# Patient Record
Sex: MALE | Race: WHITE | Employment: FULL TIME | URBAN - METROPOLITAN AREA
[De-identification: names, ages, dates, MRNs, and addresses within clinical notes are randomized per-mention and may not be internally consistent; named-entity substitution may affect disease eponyms.]

---
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
## 2021-01-15 ENCOUNTER — OFFICE VISIT (OUTPATIENT)
Dept: ONCOLOGY | Age: 21
End: 2021-01-15
Payer: COMMERCIAL

## 2021-01-15 ENCOUNTER — HOSPITAL ENCOUNTER (OUTPATIENT)
Dept: INFUSION THERAPY | Age: 21
Discharge: HOME | End: 2021-01-15
Payer: COMMERCIAL

## 2021-01-15 VITALS
WEIGHT: 249 LBS | HEART RATE: 75 BPM | TEMPERATURE: 96.7 F | SYSTOLIC BLOOD PRESSURE: 133 MMHG | HEIGHT: 71 IN | RESPIRATION RATE: 16 BRPM | DIASTOLIC BLOOD PRESSURE: 78 MMHG | OXYGEN SATURATION: 97 % | BODY MASS INDEX: 34.86 KG/M2

## 2021-01-15 DIAGNOSIS — Z86.718 H/O DEEP VENOUS THROMBOSIS: ICD-10-CM

## 2021-01-15 DIAGNOSIS — Z86.711 HISTORY OF PULMONARY EMBOLISM: ICD-10-CM

## 2021-01-15 DIAGNOSIS — Z86.711 HISTORY OF PULMONARY EMBOLISM: Primary | ICD-10-CM

## 2021-01-15 LAB
ALBUMIN SERPL-MCNC: 4.4 GM/DL (ref 3.4–5)
ALP BLD-CCNC: 73 IU/L (ref 40–128)
ALT SERPL-CCNC: 22 U/L (ref 10–40)
ANION GAP SERPL CALCULATED.3IONS-SCNC: 10 MMOL/L (ref 4–16)
AST SERPL-CCNC: 24 IU/L (ref 15–37)
BASOPHILS ABSOLUTE: 0 K/CU MM
BASOPHILS RELATIVE PERCENT: 0.5 % (ref 0–1)
BILIRUB SERPL-MCNC: 0.4 MG/DL (ref 0–1)
BUN BLDV-MCNC: 15 MG/DL (ref 6–23)
CALCIUM SERPL-MCNC: 9.1 MG/DL (ref 8.3–10.6)
CHLORIDE BLD-SCNC: 104 MMOL/L (ref 99–110)
CO2: 24 MMOL/L (ref 21–32)
CREAT SERPL-MCNC: 0.9 MG/DL (ref 0.9–1.3)
DIFFERENTIAL TYPE: ABNORMAL
EOSINOPHILS ABSOLUTE: 0.3 K/CU MM
EOSINOPHILS RELATIVE PERCENT: 4.2 % (ref 0–3)
GFR AFRICAN AMERICAN: >60 ML/MIN/1.73M2
GFR NON-AFRICAN AMERICAN: >60 ML/MIN/1.73M2
GLUCOSE BLD-MCNC: 102 MG/DL (ref 70–99)
HCT VFR BLD CALC: 45.5 % (ref 42–52)
HEMOGLOBIN: 15.8 GM/DL (ref 13.5–18)
HOMOCYSTEINE: 30 UMOL/L (ref 0–10)
IMMATURE NEUTROPHIL %: 0.3 % (ref 0–0.43)
LYMPHOCYTES ABSOLUTE: 2.2 K/CU MM
LYMPHOCYTES RELATIVE PERCENT: 35.6 % (ref 25–45)
MCH RBC QN AUTO: 32.7 PG (ref 27–31)
MCHC RBC AUTO-ENTMCNC: 34.7 % (ref 32–36)
MCV RBC AUTO: 94.2 FL (ref 78–100)
MONOCYTES ABSOLUTE: 0.6 K/CU MM
MONOCYTES RELATIVE PERCENT: 9.1 % (ref 0–4)
NUCLEATED RBC %: 0 %
PDW BLD-RTO: 11.4 % (ref 11.7–14.9)
PLATELET # BLD: 263 K/CU MM (ref 140–440)
PMV BLD AUTO: 9.6 FL (ref 7.5–11.1)
POTASSIUM SERPL-SCNC: 4.2 MMOL/L (ref 3.5–5.1)
RBC # BLD: 4.83 M/CU MM (ref 4.6–6.2)
SEGMENTED NEUTROPHILS ABSOLUTE COUNT: 3.1 K/CU MM
SEGMENTED NEUTROPHILS RELATIVE PERCENT: 50.3 % (ref 34–64)
SODIUM BLD-SCNC: 138 MMOL/L (ref 135–145)
TOTAL IMMATURE NEUTOROPHIL: 0.02 K/CU MM
TOTAL NUCLEATED RBC: 0 K/CU MM
TOTAL PROTEIN: 6.3 GM/DL (ref 6.4–8.2)
WBC # BLD: 6.2 K/CU MM (ref 4–10.5)

## 2021-01-15 PROCEDURE — 85025 COMPLETE CBC W/AUTO DIFF WBC: CPT

## 2021-01-15 PROCEDURE — 85302 CLOT INHIBIT PROT C ANTIGEN: CPT

## 2021-01-15 PROCEDURE — 83090 ASSAY OF HOMOCYSTEINE: CPT

## 2021-01-15 PROCEDURE — 80053 COMPREHEN METABOLIC PANEL: CPT

## 2021-01-15 PROCEDURE — 86146 BETA-2 GLYCOPROTEIN ANTIBODY: CPT

## 2021-01-15 PROCEDURE — G8427 DOCREV CUR MEDS BY ELIG CLIN: HCPCS | Performed by: INTERNAL MEDICINE

## 2021-01-15 PROCEDURE — 4004F PT TOBACCO SCREEN RCVD TLK: CPT | Performed by: INTERNAL MEDICINE

## 2021-01-15 PROCEDURE — 99211 OFF/OP EST MAY X REQ PHY/QHP: CPT

## 2021-01-15 PROCEDURE — G8484 FLU IMMUNIZE NO ADMIN: HCPCS | Performed by: INTERNAL MEDICINE

## 2021-01-15 PROCEDURE — 81241 F5 GENE: CPT

## 2021-01-15 PROCEDURE — 86147 CARDIOLIPIN ANTIBODY EA IG: CPT

## 2021-01-15 PROCEDURE — G8417 CALC BMI ABV UP PARAM F/U: HCPCS | Performed by: INTERNAL MEDICINE

## 2021-01-15 PROCEDURE — 81240 F2 GENE: CPT

## 2021-01-15 PROCEDURE — 85613 RUSSELL VIPER VENOM DILUTED: CPT

## 2021-01-15 PROCEDURE — 85303 CLOT INHIBIT PROT C ACTIVITY: CPT

## 2021-01-15 PROCEDURE — 85305 CLOT INHIBIT PROT S TOTAL: CPT

## 2021-01-15 PROCEDURE — 99214 OFFICE O/P EST MOD 30 MIN: CPT | Performed by: INTERNAL MEDICINE

## 2021-01-15 PROCEDURE — 36415 COLL VENOUS BLD VENIPUNCTURE: CPT

## 2021-01-15 PROCEDURE — 85300 ANTITHROMBIN III ACTIVITY: CPT

## 2021-01-15 ASSESSMENT — PATIENT HEALTH QUESTIONNAIRE - PHQ9
SUM OF ALL RESPONSES TO PHQ QUESTIONS 1-9: 0
SUM OF ALL RESPONSES TO PHQ QUESTIONS 1-9: 0
1. LITTLE INTEREST OR PLEASURE IN DOING THINGS: 0
SUM OF ALL RESPONSES TO PHQ9 QUESTIONS 1 & 2: 0

## 2021-01-15 NOTE — PROGRESS NOTES
MA Rooming Questions  Patient: Vincent Plaza  MRN: [id number]    Date: 1/15/2021        1. Do you have any new issues? yes - pain and tightness in right abdomen         2. Do you need any refills on medications?    no    3. Have you had any imaging done since your last visit? yes - xrays    4. Have you been hospitalized or seen in the emergency room since your last visit here?   yes [facility] for gallbladder removal    5. Did the patient have a depression screening completed today?  Yes    PHQ-9 Total Score: 0 (1/15/2021 11:42 AM)       PHQ-9 Given to (if applicable):               PHQ-9 Score (if applicable):                     [] Positive     [x]  Negative              Does question #9 need addressed (if applicable)                     [] Yes    []  No               Jakob Lira MA

## 2021-01-15 NOTE — PROGRESS NOTES
Patient Name: Panda Maya  Patient : 2000  Patient MRN: [id number]     Primary Oncologist: Darron Costa MD  Referring Provider: Jamey Hdez MD     Date of Service: 1/15/2021      Chief Complaint:   Chief Complaint   Patient presents with    Follow-up    Other     pain and tightness in right abdomen     Patient Active Problem List:     History of fracture of left ankle     History of pulmonary embolism     H/O deep venous thrombosis    HPI:  Scarlet Maliks. Gregorio Hamilton is a 23-year-old very pleasant gentleman, initially presented to us on 10/25/19 for evaluation of his left LE DVT and PE. He stated that he fractured his right tibia after a fall from [address] [place] backyard, when he was young. According to old chart he developed right LD acute DVT. He wasn't sure if he developed DVT after that (I received that informations from old chart and don't know how accurate was that informations. It seems like informations at that time was given by her parents. And he does't seem to remember). No other Thromboembolic phenomenon after that. Reported that on October 15 2019,  he heard a pop when he was taking a step forward, and was found to have left fifth metatarsal fracture. He was placed on a splint followed by a boot. On 2019, he noticed pain in the calf with mild swelling. He also reported right-sided chest pain, pleuritic in nature. Ultrasound lower extremity done on 10/22/19 showed occlusive DVT in the left popliteal, posterior tibial, and peroneal veins    CTA chest done on 10/22/19 showed small subsegmental embolism in the right lower lobe. Mild patchy subpleural opacities in the periphery of the right lung base compatible with hemorrhage versus infarction versus atelectasis versus pneumonia. He denies sedentary life style. One of his oldest sister has DVT after surgery. He said she is also morbidly obese. No other family history of VTE. He was treated with xarelto for about 5 months at that time. He underwent laparoscopic cholecystectomy for chronic calculous cholecystitis by Dr. Glory Jackson on 10/20/20. Four days later, he presented with right scrotal pain. 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 His pain went away soon after that. He was seen by Dr. Opal Clarke and he believe his right scrotal pain is possibly due to segmental infarct of right epididymus. In view of his prior history of DVT/PE and possible segmental infarct of right epididymus, he was referred to me for further evaluation of possible hypercoagulable state. He doesn't have any significant symptoms at today visit. Past Medical History  None significant    Surgical History  Significant for   1. Tonsillectomy and adenoidectomy  2. Cholecystectomy    Allergies  No known medication allergies    Social History  He is a current smoker and he smokes 2 to 3 cigarettes/day since 7/2020. He denies alcohol drinking or illicit drug abuse. Family History  Paternal grandfather with pancreatic cancer. One of his sister has DVT. Oncology History    No history exists. Review of Systems: \"Per interval history; otherwise 10 point ROS is negative. \"  \"Per interval history; otherwise 10 point ROS is negative. \"  His energy level is good, appetite, and sleep are fine. He doesn't have fever, chills, night sweats, cough, shortness of breath, chest pain, hemoptysis or palpitations. His bowel and bladder functions are normal. He denies nausea, vomiting, diarrhea, constipation, dysuria, loss of appetite or weight loss. He doesn't have neuropathy and he denies bleeding or clotting issues. He denies any pain on today visit. No anxiety or depression.  The rest of the systems are unremarkable.  Vital Signs:  /78 (Site: Right Upper Arm, Position: Sitting, Cuff Size: Medium Adult)   Pulse 75   Temp (!) 96.7 °F (35.9 °C) (Infrared)   Resp 16   Ht 5' 11\" (1.803 m)   Wt (!) 249 lb (112.9 kg)   SpO2 97%   BMI 34.73 kg/m²     Physical Exam:  CONSTITUTIONAL: awake, alert, cooperative, no apparent distress   EYES: pupils equal, round and reactive to light, sclera clear and conjunctiva normal  ENT: Normocephalic, without obvious abnormality, atraumatic  NECK: supple, symmetrical, no jugular venous distension and no carotid bruits   HEMATOLOGIC/LYMPHATIC: no cervical, supraclavicular or axillary lymphadenopathy   LUNGS: VBS, no wheezes, no crackles, no rhonchi, no increased work of breathing and clear to auscultation   CARDIOVASCULAR: regular rate and rhythm, normal S1 and S2, no murmur noted  ABDOMEN: normal bowel sounds x 4, soft, non-distended, non-tender, no masses palpated, no hepatosplenomegaly   MUSCULOSKELETAL: full range of motion noted, tone is normal  NEUROLOGIC: awake, alert, oriented to name, place and time. Motor skills grossly intact. SKIN: Normal skin color, texture, turgor and no jaundice.  appears intact   EXTREMITIES: no LE edema, no leg swelling, no cyanosis, no clubbing     Labs:  Hematology:  Lab Results   Component Value Date    WBC 6.2 01/15/2021    RBC 4.83 01/15/2021    HGB 15.8 01/15/2021    HCT 45.5 01/15/2021    MCV 94.2 01/15/2021    MCH 32.7 (H) 01/15/2021    MCHC 34.7 01/15/2021    RDW 11.4 (L) 01/15/2021     01/15/2021    MPV 9.6 01/15/2021    SEGSPCT 50.3 01/15/2021    EOSRELPCT 4.2 (H) 01/15/2021    BASOPCT 0.5 01/15/2021    LYMPHOPCT 35.6 01/15/2021    MONOPCT 9.1 (H) 01/15/2021    SEGSABS 3.1 01/15/2021    EOSABS 0.3 01/15/2021    BASOSABS 0.0 01/15/2021    LYMPHSABS 2.2 01/15/2021    MONOSABS 0.6 01/15/2021    DIFFTYPE AUTOMATED DIFFERENTIAL 01/15/2021     No results found for: ESR  Chemistry:  Lab Results   Component Value Date     10/24/2020 K 3.7 10/24/2020     10/24/2020    CO2 27 10/24/2020    BUN 10 10/24/2020    CREATININE 0.9 10/24/2020    GLUCOSE 93 10/24/2020    CALCIUM 8.9 10/24/2020    PROT 6.6 10/24/2020    LABALBU 4.3 10/24/2020    BILITOT 0.3 10/24/2020    ALKPHOS 84 10/24/2020    AST 55 (H) 10/24/2020     (H) 10/24/2020    LABGLOM >60 10/24/2020    GFRAA >60 10/24/2020     No results found for: MMA, LDH, HOMOCYSTEINE  No components found for: LD  No results found for: TSHHS, T4FREE, FT3  Immunology:  Lab Results   Component Value Date    PROT 6.6 10/24/2020     No results found for: Meño Rook, KLFLCR  No results found for: B2M  Coagulation Panel:  No results found for: PROTIME, INR, APTT, DDIMER  Anemia Panel:  No results found for: KMLUBBUC17, FOLATE  Tumor Markers:  No results found for: , CEA, , LABCA2, PSA  Observations:  PHQ-9 Total Score: 0 (1/15/2021 11:42 AM)     Assessment & Plan:   Adam PHAM Caron Christopher is a 43-year-old gentleman with history of LLE DVT and PE in 10/2019. It seems like provoked secondary to the stress fracture causing impaired mobility at that time. He was treated with 5 months of xarelto at that time. On 10/24/20, he presented with right scrotal pain, 4 days after laparoscopic cholecystectomy. Was seen by Dr. Vince Alcantara and he believes that he probably had segmental infarct in right epididymus. Since he has both arterial and venous thrombosis at age younger than 39, I recommend to send for proper hypercoagulable work ups. Based on findings on hypercoagulable work ups, we will decide if he needs to be on either anticoagulation or antiplatelet therapy. I answered all his questions and concerns for today. I asked him to follow up with primary care physician, Dr. Krish Marsh and urology, Dr. Vince Alcantara on regular basis. Thank you for allowing me to participate in the care of this very pleasant gentleman. Recent imaging and labs were reviewed and discussed with the patient.

## 2021-01-17 LAB
ANTICARDIOLIPIN IGA ANTIBODY: 0 APL (ref 0–11)
ANTICARDIOLIPIN IGG ANTIBODY: 0 GPL (ref 0–14)
ANTITHROMBIN ACTIVITY: 94 % (ref 76–128)
BETA 2 GLYCOPROT.1 IGA AB: 2 SAU (ref 0–20)
BETA 2 GLYCOPROT.1 IGM AB: 1 SMU (ref 0–20)
BETA-2 GLYCOPROTEIN 1 IGG ANTIBODY: 0 SGU (ref 0–20)
CARDIOLIPIN AB IGM: 0 MPL (ref 0–12)
PROTEIN C ACTIVITY: 124 % (ref 83–168)
PROTEIN C ANTIGEN: 76 % (ref 63–153)
PROTEIN S ACTIVITY: 89 % (ref 66–143)
PROTEIN S ANTIGEN, TOTAL: 91 % (ref 84–134)

## 2021-01-18 LAB
DILUTE RUSSELL VIPER VENOM TIME: 32 SEC (ref 33–44)
DRVVT 1 TO 1 MIX REFLEX ONLY: ABNORMAL SEC (ref 33–44)
DRVVT CONFIRMATION TEST: ABNORMAL RATIO

## 2021-01-20 LAB — PROTHROMBIN G20210A MUTATION: NEGATIVE

## 2021-01-23 LAB — FACTOR V LEIDEN: NEGATIVE

## 2021-01-25 LAB
CALR MUTATION: NORMAL
JAK2 EXONS 12-15 MUTATION: NORMAL
JAK2 GENE MUTATION QUAL: NORMAL
MPL 515 MUTATION: NORMAL

## 2021-02-04 NOTE — PROGRESS NOTES
He underwent laparoscopic cholecystectomy for chronic calculous cholecystitis by Dr. Crystal Turner on 10/20/20. Four days later, he presented with right scrotal pain. 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 His pain went away soon after that. He was seen by Dr. Pastor Cronin and he believe his right scrotal pain is possibly due to segmental infarct of right epididymus. In view of his prior history of DVT/PE and possible segmental infarct of right epididymus, he was referred to me for further evaluation of possible hypercoagulable state. Since he has both arterial and venous thrombosis at age younger than 40, I recommend to send for proper hypercoagulable work ups. Hypercoagulable work ups done on 1/15/21 showed mild elevation in homocysteine level (30 umol/L). The rests of the hypercoagulable work ups (including factor V leiden, prothrombin gene mutation, protein C, protein S, anti thrombin III, anti phospholipid panels, JAK2, MPL and CALR were within normal range. On February 5, 2021, he presented to me for follow-up. On today visit, I reviewed with him findings hypercoagulable work-ups. Since his homocystine level is only mildly elevated, I do not think it is a cause of his venous or arterial thrombosis. However, I recommend him to take vitamin K21 and folic acid. All other hypercoagulable work ups were within normal range and I do not recommend anticoagulabiton therapy. I recommend him to take aspirin 81 mg daily to prevent arterial thrombosis. I will continue to follow him periodically. If he develops another episode of thrombosis, I will recommend to start anticoagulation therapy at that time. He doesn't have any significant symptoms at today visit. Past Medical History  None significant    Surgical History  Significant for   1. Tonsillectomy and adenoidectomy  2.   Cholecystectomy    Allergies  No known medication allergies    Social History He is a current smoker and he smokes 2 to 3 cigarettes/day since 7/2020. He denies alcohol drinking or illicit drug abuse. Family History  Paternal grandfather with pancreatic cancer. One of his sister has DVT. Oncology History    No history exists. Review of Systems: \"Per interval history; otherwise 10 point ROS is negative. \"  His energy level is fine, appetite, and sleep are pretty good. He denies fever, chills, night sweats, cough, shortness of breath, chest pain, hemoptysis or palpitations. His bowel and bladder functions are normal. He doesn't have nausea, vomiting, diarrhea, constipation, dysuria, loss of appetite or weight loss. He denies neuropathy and he doesn't have bleeding issues. He doesn't have any pain on today visit. Denies anxiety or depression. The rest of the systems are unremarkable.      Vital Signs:  /72 (Site: Right Upper Arm, Position: Sitting, Cuff Size: Large Adult)   Pulse 61   Temp 96.8 °F (36 °C) (Infrared)   Resp 18   Ht 5' 11\" (1.803 m)   Wt (!) 247 lb 3.2 oz (112.1 kg)   SpO2 98%   BMI 34.48 kg/m²     Physical Exam:  CONSTITUTIONAL: awake, alert, cooperative, no apparent distress   EYES: pupils equal, round and reactive to light, sclera clear and conjunctiva normal  ENT: Normocephalic, without obvious abnormality, atraumatic  NECK: supple, symmetrical, no jugular venous distension and no carotid bruits   HEMATOLOGIC/LYMPHATIC: no cervical, supraclavicular or axillary lymphadenopathy   LUNGS: no wheezes, no crackles, VBS, no rhonchi, no increased work of breathing and clear to auscultation   CARDIOVASCULAR: regular rate and rhythm, normal S1 and S2, no murmur noted  ABDOMEN: normal bowel sounds x 4, soft, non-distended, non-tender, no masses palpated, no hepatosplenomegaly   MUSCULOSKELETAL: full range of motion noted, tone is normal  NEUROLOGIC: awake, alert, oriented to name, place and time. Motor skills grossly intact. Adam Cohen is a 71-year-old gentleman with history of LLE DVT and PE in 10/2019. It seems like provoked secondary to the stress fracture causing impaired mobility at that time. He was treated with 5 months of xarelto at that time. On 10/24/20, he presented with right scrotal pain, 4 days after laparoscopic cholecystectomy. Was seen by Dr. Jacob Lagos and he believes that he probably had segmental infarct in right epididymus. Since he has both arterial and venous thrombosis at age younger than 40, I recommend to send for proper hypercoagulable work ups. Hypercoagulable work ups done on 1/15/21 showed mild elevation in homocysteine level (30 umol/L). The rests of the hypercoagulable work ups (including factor V leiden, prothrombin gene mutation, protein C, protein S, anti thrombin III, anti phospholipid panels, JAK2, MPL and CALR were within normal range. On February 5, 2021, he presented to me for follow-up. On today visit, I reviewed with him findings hypercoagulable work-ups. Since his homocystine level is only mildly elevated, I do not think it is a cause of his venous or arterial thrombosis. However, I recommend him to take vitamin S39 and folic acid. All other hypercoagulable work ups were within normal range and I do not recommend anticoagulabiton therapy. I recommend him to take aspirin 81 mg daily to prevent arterial thrombosis. I will continue to follow him periodically. If he develops another episode of thrombosis, I will recommend to start anticoagulation therapy at that time. I answered all his questions and concerns for today. I asked him to follow up with primary care physician, Dr. Lila Milian and urology, Dr. Jacob Lagos on regular basis. Thank you for allowing me to participate in the care of this very pleasant gentleman. Recent imaging and labs were reviewed and discussed with the patient.

## 2021-02-05 ENCOUNTER — OFFICE VISIT (OUTPATIENT)
Dept: ONCOLOGY | Age: 21
End: 2021-02-05
Payer: COMMERCIAL

## 2021-02-05 ENCOUNTER — HOSPITAL ENCOUNTER (OUTPATIENT)
Dept: INFUSION THERAPY | Age: 21
Discharge: HOME | End: 2021-02-05
Payer: COMMERCIAL

## 2021-02-05 VITALS
RESPIRATION RATE: 18 BRPM | WEIGHT: 247.2 LBS | TEMPERATURE: 96.8 F | HEART RATE: 61 BPM | SYSTOLIC BLOOD PRESSURE: 136 MMHG | DIASTOLIC BLOOD PRESSURE: 72 MMHG | OXYGEN SATURATION: 98 % | BODY MASS INDEX: 34.61 KG/M2 | HEIGHT: 71 IN

## 2021-02-05 DIAGNOSIS — Z86.718 H/O DEEP VENOUS THROMBOSIS: ICD-10-CM

## 2021-02-05 DIAGNOSIS — Z86.711 HISTORY OF PULMONARY EMBOLISM: Primary | ICD-10-CM

## 2021-02-05 PROCEDURE — G8417 CALC BMI ABV UP PARAM F/U: HCPCS | Performed by: INTERNAL MEDICINE

## 2021-02-05 PROCEDURE — 4004F PT TOBACCO SCREEN RCVD TLK: CPT | Performed by: INTERNAL MEDICINE

## 2021-02-05 PROCEDURE — 99213 OFFICE O/P EST LOW 20 MIN: CPT | Performed by: INTERNAL MEDICINE

## 2021-02-05 PROCEDURE — 99211 OFF/OP EST MAY X REQ PHY/QHP: CPT

## 2021-02-05 PROCEDURE — G8427 DOCREV CUR MEDS BY ELIG CLIN: HCPCS | Performed by: INTERNAL MEDICINE

## 2021-02-05 PROCEDURE — G8484 FLU IMMUNIZE NO ADMIN: HCPCS | Performed by: INTERNAL MEDICINE

## 2021-02-05 RX ORDER — FOLIC ACID 1 MG/1
1 TABLET ORAL DAILY
Qty: 30 TABLET | Refills: 5 | Status: SHIPPED | OUTPATIENT
Start: 2021-02-05 | End: 2021-07-14

## 2021-02-05 RX ORDER — UBIDECARENONE 75 MG
100 CAPSULE ORAL DAILY
Qty: 30 TABLET | Refills: 3 | Status: SHIPPED | OUTPATIENT
Start: 2021-02-05 | End: 2022-01-12

## 2021-02-05 RX ORDER — OMEPRAZOLE 40 MG/1
CAPSULE, DELAYED RELEASE ORAL
COMMUNITY
Start: 2020-12-26 | End: 2022-01-03

## 2021-02-05 RX ORDER — ASPIRIN 81 MG/1
162 TABLET ORAL DAILY
Qty: 90 TABLET | Refills: 1 | Status: SHIPPED | OUTPATIENT
Start: 2021-02-05 | End: 2021-03-22

## 2021-02-05 NOTE — PROGRESS NOTES
MA Rooming Questions  Patient: Emily Olivia  MRN: [id number]    Date: 2/5/2021        1. Do you have any new issues?   no         2. Do you need any refills on medications?    no    3. Have you had any imaging done since your last visit?   no    4. Have you been hospitalized or seen in the emergency room since your last visit here?   no    5. Did the patient have a depression screening completed today?  No    No data recorded     PHQ-9 Given to (if applicable):               PHQ-9 Score (if applicable):                     [] Positive     []  Negative              Does question #9 need addressed (if applicable)                     [] Yes    []  No               Driss Chawla MA

## 2021-03-22 RX ORDER — ASPIRIN 81 MG/1
TABLET ORAL
Qty: 90 TABLET | Refills: 1 | Status: SHIPPED | OUTPATIENT
Start: 2021-03-22 | End: 2022-01-12

## 2021-07-07 ENCOUNTER — HOSPITAL ENCOUNTER (EMERGENCY)
Age: 21
Discharge: HOME | End: 2021-07-07
Attending: EMERGENCY MEDICINE
Payer: COMMERCIAL

## 2021-07-07 ENCOUNTER — APPOINTMENT (OUTPATIENT)
Dept: GENERAL RADIOLOGY | Age: 21
End: 2021-07-07
Payer: COMMERCIAL

## 2021-07-07 VITALS
OXYGEN SATURATION: 97 % | BODY MASS INDEX: 35 KG/M2 | TEMPERATURE: 98.1 F | HEART RATE: 58 BPM | SYSTOLIC BLOOD PRESSURE: 132 MMHG | HEIGHT: 71 IN | DIASTOLIC BLOOD PRESSURE: 73 MMHG | WEIGHT: 250 LBS | RESPIRATION RATE: 15 BRPM

## 2021-07-07 DIAGNOSIS — R09.81 NASAL CONGESTION: ICD-10-CM

## 2021-07-07 DIAGNOSIS — R05.9 COUGH: Primary | ICD-10-CM

## 2021-07-07 PROCEDURE — 71045 X-RAY EXAM CHEST 1 VIEW: CPT

## 2021-07-07 PROCEDURE — 99284 EMERGENCY DEPT VISIT MOD MDM: CPT

## 2021-07-07 RX ORDER — BENZONATATE 100 MG/1
100 CAPSULE ORAL 3 TIMES DAILY PRN
Qty: 10 CAPSULE | Refills: 0 | Status: SHIPPED | OUTPATIENT
Start: 2021-07-07 | End: 2021-07-14

## 2021-07-07 ASSESSMENT — PAIN SCALES - GENERAL: PAINLEVEL_OUTOF10: 5

## 2021-07-07 ASSESSMENT — PAIN DESCRIPTION - PAIN TYPE: TYPE: ACUTE PAIN

## 2021-07-07 NOTE — ED NOTES
Reviewed AVS with patient who verbalized understanding. Discharged patient.      Joon Orozco RN  07/07/21 [id number]

## 2021-07-07 NOTE — ED PROVIDER NOTES
Triage Chief Complaint:   Pharyngitis (x12 days ), Cough, and Nasal Congestion    [place]:  Bk Kim is a 24 y.o. male that presents with about 12 days of sore throat, cough, shortness of breath, nasal congestion, fullness in his ears, body aches, chills and sweats. Denies sick contacts or recent travel. States that he has been seen in urgent care and has not had improvement with amoxicillin, Capmist DM, methylprednisolone. He has tested negative for Covid. Denies any known fevers. Denies vomiting except with coughing episodes. He has not had any diarrhea or abdominal pain. ROS:  At least 10 systems reviewed and otherwise acutely negative except as in the [address]     Past Medical History:   Diagnosis Date    Asthma     Follows with Dr. Romero Solitario Hx of blood clots 10/22/2019    PE (after ankle fx)     Past Surgical History:   Procedure Laterality Date    CARPAL TUNNEL RELEASE Left 07/2020    CHOLECYSTECTOMY, LAPAROSCOPIC N/A 10/20/2020    CHOLECYSTECTOMY LAPAROSCOPIC performed by Oleksandr Magaña MD at [place] [address]  2011     Family History   Problem Relation Age of Onset    High Cholesterol Mother     Cancer Paternal Grandfather      Social History     Socioeconomic History    Marital status: Single     Spouse name: Not on file    Number of children: Not on file    Years of education: Not on file    Highest education level: Not on file   Occupational History    Not on file   Tobacco Use    Smoking status: Current Every Day Smoker     Packs/day: 0.50     Years: 1.00     Pack years: 0.50     Types: Cigarettes     Start date: 2019    Smokeless tobacco: Never Used   Vaping Use    Vaping Use: Never assessed   Substance and Sexual Activity    Alcohol use: No    Drug use: Not Currently     Types: Marijuana     Comment: Last used 8/2020    Sexual activity: Never   Other Topics Concern    Not on file   Social History Narrative    ** Merged History Encounter ** Social Determinants of Health     Financial Resource Strain:     Difficulty of Paying Living Expenses:    Food Insecurity:     Worried About Running Out of Food in the Last Year:     [address] in the Last Year:    Transportation Needs:     Lack of Transportation (Medical):  Lack of Transportation (Non-Medical):    Physical Activity:     Days of Exercise per Week:     Minutes of Exercise per Session:    Stress:     Feeling of Stress :    Social Connections:     Frequency of Communication with Friends and Family:     Frequency of Social Gatherings with Friends and Family:     Attends Orthodoxy Services:     Active Member of Clubs or Organizations:     Attends Club or Organization Meetings:     Marital Status:    Intimate Partner Violence:     Fear of Current or Ex-Partner:     Emotionally Abused:     Physically Abused:     Sexually Abused:      No current facility-administered medications for this encounter. Current Outpatient Medications   Medication Sig Dispense Refill    benzonatate (TESSALON PERLES) 100 MG capsule Take 1 capsule by mouth 3 times daily as needed for Cough 10 capsule 0    aspirin 81 MG EC tablet TAKE 2 TABLETS BY MOUTH EVERY DAY 90 tablet 1    omeprazole (PRILOSEC) 40 MG delayed release capsule TAKE 1 CAPSULE BY MOUTH EVERY MORNING 30 MINUTES BEFORE BREAKFAST      vitamin B-12 (CYANOCOBALAMIN) 100 MCG tablet Take 1 tablet by mouth daily 30 tablet 3    folic acid (FOLVITE) 1 MG tablet Take 1 tablet by mouth daily 30 tablet 5     No Known Allergies    Nursing Notes Reviewed    Physical Exam:  ED Triage Vitals [07/07/21 0300]   Enc Vitals Group      /74      Pulse 71      Resp 16      Temp 98.7 °F (37.1 °C)      Temp Source Oral      SpO2 98 %      Weight 250 lb (113.4 kg)      Height 5' 11\" (1.803 m)      Head Circumference       Peak Flow       Pain Score       Pain Loc       Pain Edu? Excl. in [address]? GENERAL APPEARANCE: Awake and alert. Cooperative. No acute distress. HEAD: Normocephalic. Atraumatic. EYES: EOM's grossly intact. Sclera anicteric. ENT: Mucous membranes are moist. Tolerates saliva. No trismus. TMs clear bilaterally. No posterior pharyngeal erythema, exudate or asymmetry. NECK: Supple. No meningismus. Trachea midline. HEART: RRR. Radial pulses 2+. LUNGS: Respirations unlabored. CTAB  ABDOMEN: Soft. Non-tender. No guarding or rebound. EXTREMITIES: No acute deformities. SKIN: Warm and dry. NEUROLOGICAL: No gross facial drooping. Moves all 4 extremities spontaneously. PSYCHIATRIC: Normal mood. I have reviewed and interpreted all of the currently available lab results from this visit (if applicable):  No results found for this visit on 07/07/21. Radiographs (if obtained):  [] The following radiograph was interpreted by myself in the absence of a radiologist:  [x] Radiologist's Report Reviewed:    EKG (if obtained): (All EKG's are interpreted by myself in the absence of a cardiologist)    MDM:  Plan of care is discussed thoroughly with the patient and family if present. If performed, all imaging and lab work also discussed with patient. All relevant prior results and chart reviewed if available. Patient presents as above. He is in no acute distress and has normal vital signs. Lung sounds are clear bilaterally. Does have history of asthma but do not suspect exacerbation at this time. No evidence of otitis media. Pharyngitis not noted on exam.  States chest x-ray does not show any evidence of pneumonia. Suspect possible viral syndrome versus allergy type symptoms. Plan for trial of Zyrtec at home and follow-up with PCP this week. He is agreeable with this plan of care. I do not suspect other acute life-threatening process at this time. Clinical Impression:  1. Cough    2.  Nasal congestion      (Please note that portions of this note may have been completed with a voice recognition program. Efforts were made to edit the dictations but occasionally words are mis-transcribed.)    MD Pati Barksdale MD  07/07/21 3678

## 2021-07-14 RX ORDER — FOLIC ACID 1 MG/1
TABLET ORAL
Qty: 90 TABLET | Refills: 1 | Status: SHIPPED | OUTPATIENT
Start: 2021-07-14 | End: 2022-01-12

## 2021-08-16 ENCOUNTER — OFFICE VISIT (OUTPATIENT)
Dept: SURGERY | Age: 21
End: 2021-08-16
Payer: COMMERCIAL

## 2021-08-16 VITALS
WEIGHT: 250 LBS | OXYGEN SATURATION: 100 % | HEART RATE: 70 BPM | SYSTOLIC BLOOD PRESSURE: 120 MMHG | BODY MASS INDEX: 34.87 KG/M2 | DIASTOLIC BLOOD PRESSURE: 80 MMHG

## 2021-08-16 DIAGNOSIS — R19.7 DIARRHEA, UNSPECIFIED TYPE: ICD-10-CM

## 2021-08-16 DIAGNOSIS — R10.13 EPIGASTRIC PAIN: Primary | ICD-10-CM

## 2021-08-16 DIAGNOSIS — R11.0 NAUSEA: ICD-10-CM

## 2021-08-16 PROCEDURE — G8417 CALC BMI ABV UP PARAM F/U: HCPCS | Performed by: PHYSICIAN ASSISTANT

## 2021-08-16 PROCEDURE — 99213 OFFICE O/P EST LOW 20 MIN: CPT | Performed by: PHYSICIAN ASSISTANT

## 2021-08-16 PROCEDURE — G8427 DOCREV CUR MEDS BY ELIG CLIN: HCPCS | Performed by: PHYSICIAN ASSISTANT

## 2021-08-16 PROCEDURE — 4004F PT TOBACCO SCREEN RCVD TLK: CPT | Performed by: PHYSICIAN ASSISTANT

## 2021-08-16 RX ORDER — ONDANSETRON 4 MG/1
4 TABLET, ORALLY DISINTEGRATING ORAL 3 TIMES DAILY PRN
Qty: 21 TABLET | Refills: 0 | Status: SHIPPED | OUTPATIENT
Start: 2021-08-16 | End: 2022-01-12

## 2021-08-16 NOTE — PROGRESS NOTES
Chief Complaint   Patient presents with    Post-op Problem     c/o nausea s/p lap pati 11/20/20          SUBJECTIVE:  HPI:  Patient presents today with complaints of abdominal pain. Pain is located in the RUQ, epigastric with radiation throughout the upper abdomen. The pain is described as aching, pressure-like and shooting. Onset was 1 year ago. Symptoms initially improved after undergoing lap pati last year, but for the past few months symptoms have returned to where they were prior to the lap pati. Aggravating factors: eating. Associated symptoms: nausea and diarrhea. The patient denies chills, constipation and fever. I have reviewed the patient's (pertinent information to this visit) medical history, family history (scanned in the Media tab under \"patient questioner\"), social history and review of systems with the patient today in the office.        Past Surgical History:   Procedure Laterality Date    CARPAL TUNNEL RELEASE Left 07/2020    CHOLECYSTECTOMY, LAPAROSCOPIC N/A 10/20/2020    CHOLECYSTECTOMY LAPAROSCOPIC performed by Rajiv [facility]les [facility] at [facility] 2011     Past Medical History:   Diagnosis Date    Asthma     Follows with Dr. Rachel Herrera Hx of blood clots 10/22/2019    PE (after ankle fx)     Family History   Problem Relation Age of Onset    High Cholesterol Mother     Cancer Paternal Grandfather      Social History     Socioeconomic History    Marital status: Single     Spouse name: Not on file    Number of children: Not on file    Years of education: Not on file    Highest education level: Not on file   Occupational History    Not on file   Tobacco Use    Smoking status: Current Every Day Smoker     Packs/day: 0.50     Years: 1.00     Pack years: 0.50     Types: Cigarettes     Start date: 2019    Smokeless tobacco: Never Used   Vaping Use    Vaping Use: Never assessed   Substance and Sexual Activity    Alcohol use: No    Drug use: Not Currently Systems:       Review of Systems   Constitutional: Negative for chills and fever. HENT: Negative for ear pain, mouth sores, sore throat and tinnitus. Eyes: Negative for photophobia, redness and itching. Respiratory: Negative for apnea, choking and stridor. Cardiovascular: Negative for chest pain and palpitations. Gastrointestinal: Positive for abdominal pain and nausea. Negative for anal bleeding, constipation and rectal pain. Endocrine: Negative for polydipsia. Genitourinary: Negative for enuresis, flank pain and hematuria. Musculoskeletal: Negative for back pain, joint swelling and myalgias. Skin: Negative for color change and pallor. Allergic/Immunologic: Negative for environmental allergies. Neurological: Negative for syncope and speech difficulty. Psychiatric/Behavioral: Negative for confusion and hallucinations. OBJECTIVE:  Physical Exam:    /80   Pulse 70   Wt 250 lb (113.4 kg)   SpO2 100%   BMI 34.87 kg/m²      Physical Exam  Constitutional:       Appearance: He is well-developed. HENT:      Head: Normocephalic. Eyes:      Pupils: Pupils are equal, round, and reactive to light. Cardiovascular:      Rate and Rhythm: Normal rate. Pulmonary:      Effort: Pulmonary effort is normal.   Abdominal:      General: There is no distension. Palpations: Abdomen is soft. There is no mass. Tenderness: There is abdominal tenderness in the epigastric area. There is no guarding or rebound. Musculoskeletal:         General: Normal range of motion. Cervical back: Normal range of motion and neck supple. Skin:     General: Skin is warm. Neurological:      Mental Status: He is alert and oriented to person, place, and time. ASSESSMENT:  1. Epigastric pain    2. Nausea    3. Diarrhea, unspecified type        PLAN:  Treatment: Patient reports that his diarrhea is controlled with as needed Imodium.   He also has Zofran at home that he takes as needed for his nausea. Recommend further testing with EGD. A Covid test has been ordered. Pt has been counseled on risks, benefits, and alternatives of treatment plan at length today. Patient states an understanding and willingness to proceed with plan. No orders of the defined types were placed in this encounter. Orders Placed This Encounter   Medications    ondansetron (ZOFRAN-ODT) 4 MG disintegrating tablet     Sig: Take 1 tablet by mouth 3 times daily as needed for Nausea or Vomiting     Dispense:  21 tablet     Refill:  0        Follow Up:  Return for EGD f/u.       Angela Carlson PA-C

## 2021-08-18 NOTE — PROGRESS NOTES
Surgery  09/01/21 @ 0800 arrive @ 0630               1. Do not eat or drink anything after midnight - unless instructed by your doctor prior to surgery. This includes                   no water, chewing gum or mints. 2. Follow your directions as prescribed by the doctor for your procedure and medications. Take Omeprazole morning of with a sip. 3. Check with your Doctor regarding stopping Plavix, Coumadin, Lovenox,Effient,Pradaxa,Xarelto, Fragmin or other blood thinners and                   follow their instructions. 4. Do not smoke, and do not drink any alcoholic beverages 24 hours prior to surgery. This includes NA Beer. 5. You may brush your teeth and gargle the morning of surgery. DO NOT SWALLOW WATER   6. You MUST make arrangements for a responsible adult to take you home after your surgery and be able to check on you every couple                   hours for the day. You will not be allowed to leave alone or drive yourself home. It is strongly suggested someone stay with you the first 24                   hrs. Your surgery will be cancelled if you do not have a ride home. 7. Please wear simple, loose fitting clothing to the hospital.  Nella Shingles not bring valuables (money, credit cards, checkbooks, etc.) Do not wear any                   makeup (including no eye makeup) or nail polish on your fingers or toes. 8. DO NOT wear any jewelry or piercings on day of surgery. All body piercing jewelry must be removed. 9. If you have dentures, they will be removed before going to the OR; we will provide you a container. If you wear contact lenses or glasses,                  they will be removed; please bring a case for them. 10. If you  have a Living Will and Durable Power of  for Healthcare, please bring in a copy. 11. Please bring picture ID,  insurance card, paperwork from the doctors office    (H & P, Consent, & card for implantable devices).            12. Take a shower the night before or morning of your procedure, do not apply any lotion, oil or powder. 13. Wear a mask covering your nose & mouth when entering the hospital. Have your covid-19 test performed within 10 days of your                  surgery. Quarantine yourself after the test until after your surgery. Pt instructed to call Dr Luann Arevalo office regarding a pre-procedure covid test. Pt voiced understanding.

## 2021-08-24 ENCOUNTER — TELEPHONE (OUTPATIENT)
Dept: BARIATRICS/WEIGHT MGMT | Age: 21
End: 2021-08-24

## 2021-08-24 DIAGNOSIS — R19.5 ABNORMAL STOOLS: ICD-10-CM

## 2021-08-24 DIAGNOSIS — R11.0 NAUSEA: Primary | ICD-10-CM

## 2021-08-24 RX ORDER — CHOLESTYRAMINE 4 G/9G
1 POWDER, FOR SUSPENSION ORAL 2 TIMES DAILY
Qty: 90 PACKET | Refills: 3 | Status: SHIPPED | OUTPATIENT
Start: 2021-08-24 | End: 2022-01-03

## 2021-08-24 RX ORDER — ONDANSETRON 4 MG/1
4 TABLET, FILM COATED ORAL DAILY PRN
Qty: 30 TABLET | Refills: 0 | Status: SHIPPED | OUTPATIENT
Start: 2021-08-24 | End: 2022-01-12

## 2021-08-24 NOTE — TELEPHONE ENCOUNTER
Pt's home phone number for call back with medical advise [phone]     Pt's mother called in stating his n&v is not getting any better and needs a refill on meds, also pt is having 10-15 bm's per day - very watery and lose. Pt is very tired. 9/1/21 is the egd please advise.

## 2021-08-25 DIAGNOSIS — Z01.818 PRE-OP TESTING: Primary | ICD-10-CM

## 2021-08-25 ASSESSMENT — ENCOUNTER SYMPTOMS
PHOTOPHOBIA: 0
CHOKING: 0
CONSTIPATION: 0
ANAL BLEEDING: 0
COLOR CHANGE: 0
STRIDOR: 0
SORE THROAT: 0
EYE REDNESS: 0
RECTAL PAIN: 0
EYE ITCHING: 0
NAUSEA: 1
BACK PAIN: 0
ABDOMINAL PAIN: 1
APNEA: 0

## 2021-08-27 ENCOUNTER — NURSE ONLY (OUTPATIENT)
Dept: SURGERY | Age: 21
End: 2021-08-27
Payer: COMMERCIAL

## 2021-08-27 ENCOUNTER — HOSPITAL ENCOUNTER (OUTPATIENT)
Age: 21
Setting detail: SPECIMEN
Discharge: HOME | End: 2021-08-27
Payer: COMMERCIAL

## 2021-08-27 VITALS
HEART RATE: 73 BPM | TEMPERATURE: 97.1 F | SYSTOLIC BLOOD PRESSURE: 100 MMHG | DIASTOLIC BLOOD PRESSURE: 68 MMHG | OXYGEN SATURATION: 97 %

## 2021-08-27 DIAGNOSIS — Z01.818 PRE-OP TESTING: Primary | ICD-10-CM

## 2021-08-27 PROCEDURE — U0003 INFECTIOUS AGENT DETECTION BY NUCLEIC ACID (DNA OR RNA); SEVERE ACUTE RESPIRATORY SYNDROME CORONAVIRUS 2 (SARS-COV-2) (CORONAVIRUS DISEASE [COVID-19]), AMPLIFIED PROBE TECHNIQUE, MAKING USE OF HIGH THROUGHPUT TECHNOLOGIES AS DESCRIBED BY CMS-2020-01-R: HCPCS

## 2021-08-27 PROCEDURE — U0005 INFEC AGEN DETEC AMPLI PROBE: HCPCS

## 2021-08-27 PROCEDURE — 99211 OFF/OP EST MAY X REQ PHY/QHP: CPT | Performed by: SURGERY

## 2021-08-27 NOTE — PROGRESS NOTES
Patient collected pre-op COVID-19 screening instruction and collection supplies given to patient accordingly. Patient denies fever/cough/sob or recent travels. Patient voiced understanding of collection/quarantine instructions. COVID screening lab ordered, collection completed without difficulty, identifiers placed on specimen. AVS given at discharge. Results will be given via mychart or telephone call [facility] will manage any positive test results, the procedure will be rescheduled at a later date).

## 2021-08-27 NOTE — PATIENT INSTRUCTIONS
Pre-Procedure COVID-19 Self Testing  Quarantine Instructions  Day of Surgery Instructions         What to do before my surgery:    All patients scheduled for elective surgery must test for COVID19 72-96 hours prior to the surgery date.  Pre-Procedure COVID-19 Self-Test will be scheduled for you by your provider.  You can receive your Pre-Procedure COVID-19 Self-Test at:  [facility]. [address] [facility], [facility] [address]   If you do not have the COVID-19 test we will cancel or reschedule your procedure   Once you test you must quarantine at home until after your procedure with only your immediate family members or whoever lives with you.  If you must work during your quarantine period, we ask that you continue to practice social distancing, wear a mask that covers your mouth and nose and perform all hand hygiene as recommended by the CDC.  If you must go to the grocery, etc. and cannot get someone to do this for you please wear a mask that covers your mouth and nose and perform all hand hygiene as recommended by the CDC.  Your surgeon's office will notify you with any concerns about your test result. What can I expect on the day of surgery?  Arrive at the time the office or hospital staff tell you on the day of your procedure.  Wear a mask when entering the hospital.     A member of the hospital staff will take your temperature and ask you a few questions as you enter the building.  In abundance of caution for the safety of all our patients and staff, please follow all hospital visitor guidelines in place at the time of your procedure. The staff caring for you will stay in close communication with your loved one and keep them updated on progress.  Please provide a phone number for us to use when communicating with your family or ride home.    When you are ready to discharge, we will notify your family/person with you to bring the car to the front entrance. We will take you to them after you receive all of your discharge instructions.

## 2021-08-28 LAB
SARS-COV-2: NOT DETECTED
SOURCE: NORMAL

## 2021-08-31 ENCOUNTER — ANESTHESIA EVENT (OUTPATIENT)
Dept: ENDOSCOPY | Age: 21
End: 2021-08-31
Payer: COMMERCIAL

## 2021-08-31 NOTE — ANESTHESIA PRE PROCEDURE
Department of Anesthesiology  Preprocedure Note       Name:  Yuan Ibarra   Age:  22 y.o.  :  2000                                          MRN:  [id number]         Date:  2021      Surgeon: Milka Sage):  Royal Lucero MD    Procedure: Procedure(s):  EGD ESOPHAGOGASTRODUODENOSCOPY    Medications prior to admission:   Prior to Admission medications    Medication Sig Start Date End Date Taking? Authorizing Provider   ondansetron (ZOFRAN-ODT) 4 MG disintegrating tablet Take 1 tablet by mouth 3 times daily as needed for Nausea or Vomiting 21  Yes Elsy Wong PA-C   folic acid (FOLVITE) 1 MG tablet TAKE 1 TABLET BY MOUTH EVERY DAY 21  Yes Juan Manuel Lau MD   aspirin 81 MG EC tablet TAKE 2 TABLETS BY MOUTH EVERY DAY 3/22/21  Yes Juan Manuel Lau MD   omeprazole (PRILOSEC) 40 MG delayed release capsule TAKE 1 CAPSULE BY MOUTH EVERY MORNING 30 MINUTES BEFORE BREAKFAST 20  Yes Historical Provider, MD   vitamin B-12 (CYANOCOBALAMIN) 100 MCG tablet Take 1 tablet by mouth daily 21 Yes Juan Manuel Lau MD   ondansetron (ZOFRAN) 4 MG tablet Take 1 tablet by mouth daily as needed for Nausea or Vomiting 21   Sky Gutierrez PA-C   cholestyramine Tahira Lori) 4 g packet Take 1 packet by mouth 2 times daily 21   Sky Gutierrez PA-C       Current medications:    No current facility-administered medications for this encounter.      Current Outpatient Medications   Medication Sig Dispense Refill    ondansetron (ZOFRAN-ODT) 4 MG disintegrating tablet Take 1 tablet by mouth 3 times daily as needed for Nausea or Vomiting 21 tablet 0    folic acid (FOLVITE) 1 MG tablet TAKE 1 TABLET BY MOUTH EVERY DAY 90 tablet 1    aspirin 81 MG EC tablet TAKE 2 TABLETS BY MOUTH EVERY DAY 90 tablet 1    omeprazole (PRILOSEC) 40 MG delayed release capsule TAKE 1 CAPSULE BY MOUTH EVERY MORNING 30 MINUTES BEFORE BREAKFAST      vitamin B-12 (CYANOCOBALAMIN) 100 MCG tablet Take 1 tablet by mouth daily 30 tablet 3    ondansetron (ZOFRAN) 4 MG tablet Take 1 tablet by mouth daily as needed for Nausea or Vomiting 30 tablet 0    cholestyramine (QUESTRAN) 4 g packet Take 1 packet by mouth 2 times daily 90 packet 3       Allergies:  No Known Allergies    Problem List:    Patient Active Problem List   Diagnosis Code    History of fracture of left ankle Z87.81    History of pulmonary embolism Z86.711    H/O deep venous thrombosis Z86.718       Past Medical History:        Diagnosis Date    Asthma     Follows with Dr. Santiago Mclaughlin Hx of blood clots 10/22/2019    PE (after ankle fx)       Past Surgical History:        Procedure Laterality Date    CARPAL TUNNEL RELEASE Left 07/2020    CHOLECYSTECTOMY, LAPAROSCOPIC N/A 10/20/2020    CHOLECYSTECTOMY LAPAROSCOPIC performed by Carlos Manuel Barcenas MD at [address] [facility]  2011       Social History:    Social History     Tobacco Use    Smoking status: Current Every Day Smoker     Packs/day: 0.50     Years: 1.00     Pack years: 0.50     Types: Cigarettes     Start date: 2019    Smokeless tobacco: Never Used   Substance Use Topics    Alcohol use: No                                Ready to quit: Not Answered  Counseling given: Not Answered      Vital Signs (Current):   Vitals:    08/18/21 1432   Weight: 250 lb (113.4 kg)   Height: 5' 11\" (1.803 m)                                              BP Readings from Last 3 Encounters:   08/27/21 100/68   08/16/21 120/80   07/07/21 132/73       NPO Status:                                                                                 BMI:   Wt Readings from Last 3 Encounters:   08/16/21 250 lb (113.4 kg) (>99 %, Z= 2.38)*   07/07/21 250 lb (113.4 kg) (>99 %, Z= 2.39)*   02/05/21 (!) 247 lb 3.2 oz (112.1 kg) (>99 %, Z= 2.37)*     * Growth percentiles are based on CDC (Boys, 2-20 Years) data. Body mass index is 34.87 kg/m².     CBC:   Lab Results   Component Value Date    WBC 6.2 01/15/2021    RBC 4.83 01/15/2021    HGB 15.8 01/15/2021    HCT 45.5 01/15/2021    MCV 94.2 01/15/2021    RDW 11.4 01/15/2021     01/15/2021       CMP:   Lab Results   Component Value Date     01/15/2021    K 4.2 01/15/2021     01/15/2021    CO2 24 01/15/2021    BUN 15 01/15/2021    CREATININE 0.9 01/15/2021    GFRAA >60 01/15/2021    LABGLOM >60 01/15/2021    GLUCOSE 102 01/15/2021    PROT 6.3 01/15/2021    CALCIUM 9.1 01/15/2021    BILITOT 0.4 01/15/2021    ALKPHOS 73 01/15/2021    AST 24 01/15/2021    ALT 22 01/15/2021       POC Tests: No results for input(s): POCGLU, POCNA, POCK, POCCL, POCBUN, POCHEMO, POCHCT in the last 72 hours. Coags: No results found for: PROTIME, INR, APTT    HCG (If Applicable): No results found for: PREGTESTUR, PREGSERUM, HCG, HCGQUANT     ABGs: No results found for: PHART, PO2ART, MHF5JMD, YZW6SLG, BEART, J5PPLHGB     Type & Screen (If Applicable):  No results found for: LABABO, LABRH    Drug/Infectious Status (If Applicable):  No results found for: HIV, HEPCAB    COVID-19 Screening (If Applicable):   Lab Results   Component Value Date    COVID19 NOT DETECTED 08/27/2021           Anesthesia Evaluation  Patient summary reviewed and Nursing notes reviewed no history of anesthetic complications:   Airway: Mallampati: I  TM distance: >3 FB   Neck ROM: full  Mouth opening: > = 3 FB Dental:          Pulmonary: breath sounds clear to auscultation  (+) asthma: current smoker          Patient did not smoke on day of surgery. Cardiovascular:Negative CV ROS  Exercise tolerance: good (>4 METS),         ECG reviewed  Rhythm: regular  Rate: normal           Beta Blocker:  Not on Beta Blocker         Neuro/Psych:   Negative Neuro/Psych ROS              GI/Hepatic/Renal: Neg GI/Hepatic/Renal ROS            Endo/Other: Negative Endo/Other ROS                    Abdominal:   (+) obese,           Vascular: negative vascular ROS.          Other Findings:           Anesthesia Plan      MAC and general     ASA 3 Induction: intravenous. Anesthetic plan and risks discussed with patient. Plan discussed with CRNA.                 EVA Garcia - VINAY   8/31/2021

## 2021-08-31 NOTE — PROGRESS NOTES
Aguilarrubi Cao and confirmed with patient  Surgery is 9/1/21    at 0800   with an arrival time of 0600  . Come into the Main entrance and check in. You can bring one person with you and wear a mask. Patient verbalized understanding.

## 2021-09-01 ENCOUNTER — HOSPITAL ENCOUNTER (OUTPATIENT)
Age: 21
Setting detail: OUTPATIENT SURGERY
Discharge: HOME | End: 2021-09-01
Attending: SURGERY | Admitting: SURGERY
Payer: COMMERCIAL

## 2021-09-01 ENCOUNTER — ANESTHESIA (OUTPATIENT)
Dept: ENDOSCOPY | Age: 21
End: 2021-09-01
Payer: COMMERCIAL

## 2021-09-01 VITALS
RESPIRATION RATE: 18 BRPM | BODY MASS INDEX: 35 KG/M2 | SYSTOLIC BLOOD PRESSURE: 111 MMHG | DIASTOLIC BLOOD PRESSURE: 71 MMHG | HEIGHT: 71 IN | HEART RATE: 60 BPM | WEIGHT: 250 LBS | TEMPERATURE: 98 F | OXYGEN SATURATION: 99 %

## 2021-09-01 VITALS
RESPIRATION RATE: 24 BRPM | DIASTOLIC BLOOD PRESSURE: 51 MMHG | SYSTOLIC BLOOD PRESSURE: 94 MMHG | OXYGEN SATURATION: 99 %

## 2021-09-01 PROCEDURE — 43239 EGD BIOPSY SINGLE/MULTIPLE: CPT | Performed by: SURGERY

## 2021-09-01 PROCEDURE — 7100000010 HC PHASE II RECOVERY - FIRST 15 MIN: Performed by: SURGERY

## 2021-09-01 PROCEDURE — 2709999900 HC NON-CHARGEABLE SUPPLY: Performed by: SURGERY

## 2021-09-01 PROCEDURE — 2500000003 HC RX 250 WO HCPCS: Performed by: NURSE ANESTHETIST, CERTIFIED REGISTERED

## 2021-09-01 PROCEDURE — 6360000002 HC RX W HCPCS

## 2021-09-01 PROCEDURE — 2580000003 HC RX 258: Performed by: ANESTHESIOLOGY

## 2021-09-01 PROCEDURE — 3700000000 HC ANESTHESIA ATTENDED CARE: Performed by: SURGERY

## 2021-09-01 PROCEDURE — 7100000011 HC PHASE II RECOVERY - ADDTL 15 MIN: Performed by: SURGERY

## 2021-09-01 PROCEDURE — 88305 TISSUE EXAM BY PATHOLOGIST: CPT | Performed by: PATHOLOGY

## 2021-09-01 PROCEDURE — 3609012400 HC EGD TRANSORAL BIOPSY SINGLE/MULTIPLE: Performed by: SURGERY

## 2021-09-01 PROCEDURE — 3700000001 HC ADD 15 MINUTES (ANESTHESIA): Performed by: SURGERY

## 2021-09-01 PROCEDURE — 6360000002 HC RX W HCPCS: Performed by: NURSE ANESTHETIST, CERTIFIED REGISTERED

## 2021-09-01 PROCEDURE — 88342 IMHCHEM/IMCYTCHM 1ST ANTB: CPT | Performed by: PATHOLOGY

## 2021-09-01 PROCEDURE — 2580000003 HC RX 258: Performed by: NURSE ANESTHETIST, CERTIFIED REGISTERED

## 2021-09-01 RX ORDER — SODIUM CHLORIDE, SODIUM LACTATE, POTASSIUM CHLORIDE, CALCIUM CHLORIDE 600; 310; 30; 20 MG/100ML; MG/100ML; MG/100ML; MG/100ML
INJECTION, SOLUTION INTRAVENOUS CONTINUOUS PRN
Status: DISCONTINUED | OUTPATIENT
Start: 2021-09-01 | End: 2021-09-01 | Stop reason: SDUPTHER

## 2021-09-01 RX ORDER — PROPOFOL 10 MG/ML
INJECTION, EMULSION INTRAVENOUS PRN
Status: DISCONTINUED | OUTPATIENT
Start: 2021-09-01 | End: 2021-09-01 | Stop reason: SDUPTHER

## 2021-09-01 RX ORDER — SODIUM CHLORIDE, SODIUM LACTATE, POTASSIUM CHLORIDE, CALCIUM CHLORIDE 600; 310; 30; 20 MG/100ML; MG/100ML; MG/100ML; MG/100ML
INJECTION, SOLUTION INTRAVENOUS ONCE
Status: COMPLETED | OUTPATIENT
Start: 2021-09-01 | End: 2021-09-01

## 2021-09-01 RX ORDER — LIDOCAINE HYDROCHLORIDE 20 MG/ML
INJECTION, SOLUTION INFILTRATION; PERINEURAL PRN
Status: DISCONTINUED | OUTPATIENT
Start: 2021-09-01 | End: 2021-09-01 | Stop reason: SDUPTHER

## 2021-09-01 RX ADMIN — SODIUM CHLORIDE, POTASSIUM CHLORIDE, SODIUM LACTATE AND CALCIUM CHLORIDE: 600; 310; 30; 20 INJECTION, SOLUTION INTRAVENOUS at 07:11

## 2021-09-01 RX ADMIN — LIDOCAINE HYDROCHLORIDE 100 MG: 20 INJECTION, SOLUTION INFILTRATION; PERINEURAL at 08:14

## 2021-09-01 RX ADMIN — PROPOFOL 240 MG: 10 INJECTION, EMULSION INTRAVENOUS at 08:14

## 2021-09-01 RX ADMIN — SODIUM CHLORIDE, POTASSIUM CHLORIDE, SODIUM LACTATE AND CALCIUM CHLORIDE: 600; 310; 30; 20 INJECTION, SOLUTION INTRAVENOUS at 08:10

## 2021-09-01 ASSESSMENT — LIFESTYLE VARIABLES: SMOKING_STATUS: 1

## 2021-09-01 ASSESSMENT — PAIN SCALES - GENERAL: PAINLEVEL_OUTOF10: 0

## 2021-09-01 NOTE — ANESTHESIA POSTPROCEDURE EVALUATION
Department of Anesthesiology  Postprocedure Note    Patient: Shelton Milan  MRN: [id number]  YOB: 2000  Date of evaluation: 9/1/2021  Time:  8:41 AM     Procedure Summary     Date: 09/01/21 Room / Location: [address] [place] [address]    Anesthesia Start: 7164 Anesthesia Stop: 8408    Procedure: EGD BIOPSY (N/A ) Diagnosis: (RIGHT UPPER QUAD PAIN)    Surgeons: Bev Galdamez MD Responsible Provider: Karlee Slaughter DO    Anesthesia Type: MAC, general ASA Status: 3          Anesthesia Type: MAC, general    Lisette Phase I:      Lisette Phase II:      Last vitals: Reviewed and per EMR flowsheets.        Anesthesia Post Evaluation    Patient location during evaluation: bedside  Patient participation: complete - patient participated  Level of consciousness: awake and alert  Pain score: 0  Airway patency: patent  Nausea & Vomiting: no nausea and no vomiting  Complications: no  Cardiovascular status: blood pressure returned to baseline and hemodynamically stable  Respiratory status: acceptable, room air and spontaneous ventilation  Hydration status: euvolemic

## 2021-09-01 NOTE — PROGRESS NOTES
Patient is awake, alert and oriented. Preop questions reviewed and verified. H&P and consent completed. Report received from Francisca (\[facility] [place] [facility]\"").

## 2021-09-01 NOTE — FLOWSHEET NOTE
Returned to room 13. Report received from [address] [place] [address],[address] per Marcos Harvey RN. Alert and oriented. Respirations even and unlabored. Color pink. Abdomen soft and nontender. Beverage provided. Call light in reach.

## 2021-09-01 NOTE — H&P
Chief Complaint   Patient presents with    Post-op Problem       c/o nausea s/p lap pati 11/20/20             SUBJECTIVE:  HPI:  Patient presents today with complaints of abdominal pain. Pain is located in the RUQ, epigastric with radiation throughout the upper abdomen. The pain is described as aching, pressure-like and shooting. Onset was 1 year ago. Symptoms initially improved after undergoing lap pati last year, but for the past few months symptoms have returned to where they were prior to the lap pati. Aggravating factors: eating. Associated symptoms: nausea and diarrhea.  The patient denies chills, constipation and fever.     I have reviewed the patient's (pertinent information to this visit) medical history, family history (scanned in the Media tab under \"patient questioner\"), social history and review of systems with the patient today in the office.        Past Surgical History         Past Surgical History:   Procedure Laterality Date    CARPAL TUNNEL RELEASE Left 07/2020    CHOLECYSTECTOMY, LAPAROSCOPIC N/A 10/20/2020     CHOLECYSTECTOMY LAPAROSCOPIC performed by Evelyn Salmeron MD at [facility] 5   2011         Past Medical History        Past Medical History:   Diagnosis Date    Asthma       Follows with Dr. Laureen Ferguson Hx of blood clots 10/22/2019     PE (after ankle fx)         Family History         Family History   Problem Relation Age of Onset    High Cholesterol Mother      Cancer Paternal Grandfather           Social History               Socioeconomic History    Marital status: Single       Spouse name: Not on file    Number of children: Not on file    Years of education: Not on file    Highest education level: Not on file   Occupational History    Not on file   Tobacco Use    Smoking status: Current Every Day Smoker       Packs/day: 0.50       Years: 1.00       Pack years: 0.50       Types: Cigarettes       Start date: 2019   [facility] Smokeless tobacco: Never Used   Vaping Use    Vaping Use: Never assessed   Substance and Sexual Activity    Alcohol use: No    Drug use: Not Currently       Types: Marijuana       Comment: Last used 8/2020    Sexual activity: Never   Other Topics Concern    Not on file   Social History Narrative     ** Merged History Encounter **            Social Determinants of Health          Financial Resource Strain:     Difficulty of Paying Living Expenses:    Food Insecurity:     Worried About Running Out of Food in the Last Year:     Ran Out of Food in the Last Year:    Transportation Needs:     Lack of Transportation (Medical):      Lack of Transportation (Non-Medical):    Physical Activity:     Days of Exercise per Week:     Minutes of Exercise per Session:    Stress:     Feeling of Stress :    Social Connections:     Frequency of Communication with Friends and Family:     Frequency of Social Gatherings with Friends and Family:     Attends [employer] Services:     Active Member of Clubs or Organizations:     Attends Club or Organization Meetings:     Marital Status:    Intimate Partner Violence:     Fear of Current or Ex-Partner:     Emotionally Abused:     Physically Abused:     Sexually Abused:             Current Facility-Administered Medications          Current Outpatient Medications   Medication Sig Dispense Refill    ondansetron (ZOFRAN-ODT) 4 MG disintegrating tablet Take 1 tablet by mouth 3 times daily as needed for Nausea or Vomiting 21 tablet 0    ondansetron (ZOFRAN) 4 MG tablet Take 1 tablet by mouth daily as needed for Nausea or Vomiting 30 tablet 0    cholestyramine (QUESTRAN) 4 g packet Take 1 packet by mouth 2 times daily 90 packet 3    folic acid (FOLVITE) 1 MG tablet TAKE 1 TABLET BY MOUTH EVERY DAY 90 tablet 1    aspirin 81 MG EC tablet TAKE 2 TABLETS BY MOUTH EVERY DAY 90 tablet 1    omeprazole (PRILOSEC) 40 MG delayed release capsule TAKE 1 CAPSULE BY MOUTH EVERY MORNING 30 MINUTES BEFORE BREAKFAST        vitamin B-12 (CYANOCOBALAMIN) 100 MCG tablet Take 1 tablet by mouth daily 30 tablet 3      No current facility-administered medications for this visit.         No Known Allergies     Review of Systems:       Review of Systems   Constitutional: Negative for chills and fever. HENT: Negative for ear pain, mouth sores, sore throat and tinnitus. Eyes: Negative for photophobia, redness and itching. Respiratory: Negative for apnea, choking and stridor. Cardiovascular: Negative for chest pain and palpitations. Gastrointestinal: Positive for abdominal pain and nausea. Negative for anal bleeding, constipation and rectal pain. Endocrine: Negative for polydipsia. Genitourinary: Negative for enuresis, flank pain and hematuria. Musculoskeletal: Negative for back pain, joint swelling and myalgias. Skin: Negative for color change and pallor. Allergic/Immunologic: Negative for environmental allergies. Neurological: Negative for syncope and speech difficulty. Psychiatric/Behavioral: Negative for confusion and hallucinations.         OBJECTIVE:  Physical Exam:    /80   Pulse 70   Wt 250 lb (113.4 kg)   SpO2 100%   BMI 34.87 kg/m²       Physical Exam  Constitutional:       Appearance: He is well-developed. HENT:      Head: Normocephalic. Eyes:      Pupils: Pupils are equal, round, and reactive to light. Cardiovascular:      Rate and Rhythm: Normal rate. Pulmonary:      Effort: Pulmonary effort is normal.   Abdominal:      General: There is no distension. Palpations: Abdomen is soft. There is no mass. Tenderness: There is abdominal tenderness in the epigastric area. There is no guarding or rebound. Musculoskeletal:         General: Normal range of motion. Cervical back: Normal range of motion and neck supple. Skin:     General: Skin is warm.    Neurological:      Mental Status: He is alert and oriented to person, place, and time.       ASSESSMENT:  1. Epigastric pain 2. Nausea    3.  Diarrhea, unspecified type          PLAN:  Treatment: Will proceed with EGD today        Scarlett Ng MD

## 2021-09-09 ENCOUNTER — VIRTUAL VISIT (OUTPATIENT)
Dept: SURGERY | Age: 21
End: 2021-09-09
Payer: COMMERCIAL

## 2021-09-09 DIAGNOSIS — R10.13 EPIGASTRIC PAIN: Primary | ICD-10-CM

## 2021-09-09 DIAGNOSIS — K44.9 HIATAL HERNIA: ICD-10-CM

## 2021-09-09 DIAGNOSIS — K52.81 EOSINOPHILIC GASTRITIS: ICD-10-CM

## 2021-09-09 PROCEDURE — 99212 OFFICE O/P EST SF 10 MIN: CPT | Performed by: PHYSICIAN ASSISTANT

## 2021-09-10 NOTE — PROGRESS NOTES
Abel Isaac is a 22 y.o. male evaluated via telephone on 9/9/2021. Consent:  He and/or health care decision maker is aware that that he may receive a bill for this telephone service, depending on his insurance coverage, and has provided verbal consent to proceed: Yes      Documentation:  I communicated with the patient and/or health care decision maker about his recent EGD. Details of this discussion including any medical advice provided:     Patient with recent EGD to evaluate for recurrent epigastric pain. He has also been having chronic diarrhea, which is improved since starting cholestyramine a couple of weeks ago. Patient's pain is also improved since starting cholestyramine. Pathology was reviewed with the pt:  Final Pathologic Diagnosis:   A. Stomach, antrum, biopsy:   -     No significant pathologic changes. -     No definitive Helicobacter organisms are identified   (H.pylori immunostain examined). B. Gastroesophageal junction, biopsy:   -     Squamous mucosa with up to 35 eosinophils per high   power field. -     Cardiac mucosa with mild chronic inflammation. -     No intestinal metaplasia or dysplasia identified. I recommended that the patient begin taking a PPI daily. He has been prescribed omeprazole, which he has not been taking. He states that he will start this. I also discussed slowly weaning off of cholestyramine as tolerated. He will f/u PRN. I affirm this is a Patient Initiated Episode with a Patient who has not had a related appointment within my department in the past 7 days or scheduled within the next 24 hours. Patient identification was verified at the start of the visit: Yes    Total Time: minutes: 5-10 minutes    The visit was conducted pursuant to the emergency declaration under the [facility], [address] [place] [address] authority and the [employer] Act.   Patient identification was verified, and a caregiver was present when appropriate. The patient was located in a state where the provider was credentialed to provide care.         Kishore Wan PA-C

## 2021-11-22 ENCOUNTER — TELEPHONE (OUTPATIENT)
Dept: FAMILY MEDICINE CLINIC | Age: 21
End: 2021-11-22

## 2021-12-21 ENCOUNTER — NURSE TRIAGE (OUTPATIENT)
Dept: OTHER | Facility: CLINIC | Age: 21
End: 2021-12-21

## 2022-01-03 ENCOUNTER — OFFICE VISIT (OUTPATIENT)
Dept: SURGERY | Age: 22
End: 2022-01-03
Payer: COMMERCIAL

## 2022-01-03 VITALS
WEIGHT: 247.7 LBS | BODY MASS INDEX: 34.68 KG/M2 | SYSTOLIC BLOOD PRESSURE: 110 MMHG | DIASTOLIC BLOOD PRESSURE: 80 MMHG | HEIGHT: 71 IN | OXYGEN SATURATION: 98 % | HEART RATE: 86 BPM

## 2022-01-03 DIAGNOSIS — R10.13 EPIGASTRIC PAIN: ICD-10-CM

## 2022-01-03 DIAGNOSIS — R19.7 DIARRHEA, UNSPECIFIED TYPE: Primary | ICD-10-CM

## 2022-01-03 PROCEDURE — G8427 DOCREV CUR MEDS BY ELIG CLIN: HCPCS | Performed by: PHYSICIAN ASSISTANT

## 2022-01-03 PROCEDURE — G8417 CALC BMI ABV UP PARAM F/U: HCPCS | Performed by: PHYSICIAN ASSISTANT

## 2022-01-03 PROCEDURE — 4004F PT TOBACCO SCREEN RCVD TLK: CPT | Performed by: PHYSICIAN ASSISTANT

## 2022-01-03 PROCEDURE — G8484 FLU IMMUNIZE NO ADMIN: HCPCS | Performed by: PHYSICIAN ASSISTANT

## 2022-01-03 PROCEDURE — 99213 OFFICE O/P EST LOW 20 MIN: CPT | Performed by: PHYSICIAN ASSISTANT

## 2022-01-03 ASSESSMENT — PATIENT HEALTH QUESTIONNAIRE - PHQ9
7. TROUBLE CONCENTRATING ON THINGS, SUCH AS READING THE NEWSPAPER OR WATCHING TELEVISION: 0
SUM OF ALL RESPONSES TO PHQ QUESTIONS 1-9: 10
1. LITTLE INTEREST OR PLEASURE IN DOING THINGS: 2
SUM OF ALL RESPONSES TO PHQ QUESTIONS 1-9: 10
3. TROUBLE FALLING OR STAYING ASLEEP: 1
4. FEELING TIRED OR HAVING LITTLE ENERGY: 3
8. MOVING OR SPEAKING SO SLOWLY THAT OTHER PEOPLE COULD HAVE NOTICED. OR THE OPPOSITE, BEING SO FIGETY OR RESTLESS THAT YOU HAVE BEEN MOVING AROUND A LOT MORE THAN USUAL: 0
10. IF YOU CHECKED OFF ANY PROBLEMS, HOW DIFFICULT HAVE THESE PROBLEMS MADE IT FOR YOU TO DO YOUR WORK, TAKE CARE OF THINGS AT HOME, OR GET ALONG WITH OTHER PEOPLE: 1
9. THOUGHTS THAT YOU WOULD BE BETTER OFF DEAD, OR OF HURTING YOURSELF: 0
SUM OF ALL RESPONSES TO PHQ QUESTIONS 1-9: 10
6. FEELING BAD ABOUT YOURSELF - OR THAT YOU ARE A FAILURE OR HAVE LET YOURSELF OR YOUR FAMILY DOWN: 1
SUM OF ALL RESPONSES TO PHQ9 QUESTIONS 1 & 2: 3
2. FEELING DOWN, DEPRESSED OR HOPELESS: 1
SUM OF ALL RESPONSES TO PHQ QUESTIONS 1-9: 10
5. POOR APPETITE OR OVEREATING: 2

## 2022-01-03 NOTE — PROGRESS NOTES
Chief Complaint   Patient presents with    Follow-up     abd pain/ epigastric pain. SUBJECTIVE:  HPI:  Patient presents today with complaints of abdominal pain. Pain is located in the RUQ, epigastric with no radiation. The pain is described as cramping and squeezing and \"Rotting\". Onset was a few months ago. Symptoms have been improved since. Aggravating factors: activity. Associated symptoms: diarrhea and nausea. The patient denies chills, fever and vomiting. Has pain often, but does have intermittent worsening of pain that last for 30-45 minutes. Last one was about 2-3 weeks ago. Does have some intermittent acid reflux, typically at night. Is not taking Prilosec that was prescribed previously. I have reviewed the patient's (pertinent information to this visit) medical history, family history (scanned in the Media tab under \"patient questioner\"), social history and review of systems with the patient today in the office.        Past Surgical History:   Procedure Laterality Date    CARPAL TUNNEL RELEASE Left 07/2020    CHOLECYSTECTOMY, LAPAROSCOPIC N/A 10/20/2020    CHOLECYSTECTOMY LAPAROSCOPIC performed by Natacha Andujar [facility] at [facility] 5 2011    UPPER GASTROINTESTINAL ENDOSCOPY N/A 9/1/2021    EGD BIOPSY performed by [facility]acha Andujar MD at [facility] ENDOSCOPY     Past Medical History:   Diagnosis Date    Asthma     Follows with Dr. Britney Swift Hx of blood clots 10/22/2019    PE (after ankle fx)     Family History   Problem Relation Age of Onset    High Cholesterol Mother     Cancer Paternal Grandfather      Social History     Socioeconomic History    Marital status: Single     Spouse name: Not on file    Number of children: Not on file    Years of education: Not on file    Highest education level: Not on file   Occupational History    Not on file   Tobacco Use    Smoking status: Current Every Day Smoker     Packs/day: 0.50     Years: 1.00     Pack years: 0.50 (ZOFRAN-ODT) 4 MG disintegrating tablet Take 1 tablet by mouth 3 times daily as needed for Nausea or Vomiting 21 tablet 0    folic acid (FOLVITE) 1 MG tablet TAKE 1 TABLET BY MOUTH EVERY DAY 90 tablet 1    aspirin 81 MG EC tablet TAKE 2 TABLETS BY MOUTH EVERY DAY 90 tablet 1    vitamin B-12 (CYANOCOBALAMIN) 100 MCG tablet Take 1 tablet by mouth daily 30 tablet 3     No current facility-administered medications for this visit. No Known Allergies    Review of Systems:       Review of Systems   Constitutional: Negative for chills and fever. HENT: Negative for ear pain, mouth sores, sore throat and tinnitus. Eyes: Negative for photophobia, redness and itching. Respiratory: Negative for apnea, choking and stridor. Cardiovascular: Negative for chest pain and palpitations. Gastrointestinal: Positive for abdominal pain, diarrhea and nausea. Negative for anal bleeding, blood in stool, constipation, rectal pain and vomiting. Endocrine: Negative for polydipsia. Genitourinary: Negative for enuresis, flank pain and hematuria. Musculoskeletal: Negative for back pain, joint swelling and myalgias. Skin: Negative for color change and pallor. Allergic/Immunologic: Negative for environmental allergies. Neurological: Negative for syncope and speech difficulty. Psychiatric/Behavioral: Negative for confusion and hallucinations. OBJECTIVE:  Physical Exam:    /80 (Site: Right Upper Arm, Position: Sitting, Cuff Size: Medium Adult)   Pulse 86   Ht 5' 11\" (1.803 m)   Wt 247 lb 11.2 oz (112.4 kg)   SpO2 98%   BMI 34.55 kg/m²      Physical Exam  Constitutional:       Appearance: He is well-developed. HENT:      Head: Normocephalic. Eyes:      Pupils: Pupils are equal, round, and reactive to light. Cardiovascular:      Rate and Rhythm: Normal rate. Pulmonary:      Effort: Pulmonary effort is normal.   Abdominal:      General: There is no distension. Palpations: Abdomen is soft.  There is no mass. Tenderness: There is no abdominal tenderness. There is no guarding or rebound. Musculoskeletal:         General: Normal range of motion. Cervical back: Normal range of motion and neck supple. Skin:     General: Skin is warm. Neurological:      Mental Status: He is alert and oriented to person, place, and time. ASSESSMENT:  1. Diarrhea, unspecified type    2. Epigastric pain        PLAN:  Treatment: Recommend imodium PRN for diarrhea. PT is not taking his previously prescribed Prilosec, but his acid reflux symptoms are intermittent Recommended Tums PRN is symptoms continue to be intermittent. If symptoms worsen or fail to improve, may need GI referral.  Pt has been counseled on risks, benefits, and alternatives of treatment plan at length today. Patient states an understanding and willingness to proceed with plan. No orders of the defined types were placed in this encounter. No orders of the defined types were placed in this encounter. Follow Up:  Return if symptoms worsen or fail to improve.       Tod Tirado PA-C

## 2022-01-07 ASSESSMENT — ENCOUNTER SYMPTOMS
ABDOMINAL PAIN: 1
RECTAL PAIN: 0
DIARRHEA: 1
BLOOD IN STOOL: 0
NAUSEA: 1
CONSTIPATION: 0
BACK PAIN: 0
CHOKING: 0
SORE THROAT: 0
EYE ITCHING: 0
ANAL BLEEDING: 0
STRIDOR: 0
EYE REDNESS: 0
VOMITING: 0
COLOR CHANGE: 0
PHOTOPHOBIA: 0
APNEA: 0

## 2022-01-11 ENCOUNTER — TELEPHONE (OUTPATIENT)
Dept: BARIATRICS/WEIGHT MGMT | Age: 22
End: 2022-01-11

## 2022-01-11 NOTE — TELEPHONE ENCOUNTER
Called patient about appointment with Patel Mckeon on 1/14/22.  Need to change to a phone visit if possible

## 2022-01-12 ENCOUNTER — OFFICE VISIT (OUTPATIENT)
Dept: FAMILY MEDICINE CLINIC | Age: 22
End: 2022-01-12
Payer: COMMERCIAL

## 2022-01-12 VITALS
OXYGEN SATURATION: 97 % | SYSTOLIC BLOOD PRESSURE: 114 MMHG | WEIGHT: 244.6 LBS | BODY MASS INDEX: 34.11 KG/M2 | HEART RATE: 66 BPM | DIASTOLIC BLOOD PRESSURE: 82 MMHG

## 2022-01-12 DIAGNOSIS — F32.9 REACTIVE DEPRESSION: ICD-10-CM

## 2022-01-12 DIAGNOSIS — R53.83 FATIGUE, UNSPECIFIED TYPE: Primary | ICD-10-CM

## 2022-01-12 PROCEDURE — 99214 OFFICE O/P EST MOD 30 MIN: CPT | Performed by: FAMILY MEDICINE

## 2022-01-12 PROCEDURE — G8427 DOCREV CUR MEDS BY ELIG CLIN: HCPCS | Performed by: FAMILY MEDICINE

## 2022-01-12 PROCEDURE — 4004F PT TOBACCO SCREEN RCVD TLK: CPT | Performed by: FAMILY MEDICINE

## 2022-01-12 PROCEDURE — G8484 FLU IMMUNIZE NO ADMIN: HCPCS | Performed by: FAMILY MEDICINE

## 2022-01-12 PROCEDURE — G8417 CALC BMI ABV UP PARAM F/U: HCPCS | Performed by: FAMILY MEDICINE

## 2022-01-12 RX ORDER — FLUOXETINE 10 MG/1
10 CAPSULE ORAL DAILY
Qty: 30 CAPSULE | Refills: 3 | Status: SHIPPED | OUTPATIENT
Start: 2022-01-12 | End: 2022-02-11 | Stop reason: SDUPTHER

## 2022-01-12 ASSESSMENT — ENCOUNTER SYMPTOMS
VISUAL CHANGE: 0
ABDOMINAL PAIN: 1
SWOLLEN GLANDS: 0
COUGH: 0
CHANGE IN BOWEL HABIT: 0
VOMITING: 0
NAUSEA: 0

## 2022-01-12 NOTE — PROGRESS NOTES
Lidia Connor  2000  01/15/22    Chief Complaint   Patient presents with    Fatigue    Other     Patient states a week ago he felt dizzy and checked his Bp  83/50           Patient here c/o:    Fatigue  This is a chronic problem. Episode onset: x 6 months. The problem occurs daily. Associated symptoms include abdominal pain, fatigue and weakness. Pertinent negatives include no change in bowel habit, chest pain, chills, congestion, coughing, diaphoresis, fever, headaches, joint swelling, myalgias, nausea, numbness, swollen glands, visual change or vomiting. Nothing aggravates the symptoms. He has tried nothing for the symptoms.        Past Medical History:   Diagnosis Date    Asthma     Follows with Dr. Tereso Edwards Hx of blood clots 10/22/2019    PE (after ankle fx)     Past Surgical History:   Procedure Laterality Date    CARPAL TUNNEL RELEASE Left 07/2020    CHOLECYSTECTOMY, LAPAROSCOPIC N/A 10/20/2020    CHOLECYSTECTOMY LAPAROSCOPIC performed by Matt Louis MD at [address] [place] [address]    UPPER GASTROINTESTINAL ENDOSCOPY N/A 9/1/2021    EGD BIOPSY performed by Matt Louis MD at [facility] ENDOSCOPY     Family History   Problem Relation Age of Onset    High Cholesterol Mother     Cancer Paternal Grandfather      Social History     Socioeconomic History    Marital status: Single     Spouse name: Not on file    Number of children: Not on file    Years of education: Not on file    Highest education level: Not on file   Occupational History    Not on file   Tobacco Use    Smoking status: Current Every Day Smoker     Packs/day: 0.50     Years: 1.00     Pack years: 0.50     Types: Cigarettes     Start date: 2019    Smokeless tobacco: Never Used   Vaping Use    Vaping Use: Every day    Substances: Nicotine, Flavoring    Devices: Pre-filled or refillable cartridge, Pre-filled pod   Substance and Sexual Activity    Alcohol use: No    Drug use: Not Currently     Types: Isiah Martinez)     Comment: Last used 8/2020    Sexual activity: Never   Other Topics Concern    Not on file   Social History Narrative    ** Merged History Encounter **          Social Determinants of Health     Financial Resource Strain:     Difficulty of Paying Living Expenses: Not on file   Food Insecurity:     Worried About Running Out of Food in the Last Year: Not on file    [place] of Food in the Last Year: Not on file   Transportation Needs:     Lack of Transportation (Medical): Not on file    Lack of Transportation (Non-Medical): Not on file   Physical Activity:     Days of Exercise per Week: Not on file    Minutes of Exercise per Session: Not on file   Stress:     Feeling of Stress : Not on file   Social Connections:     Frequency of Communication with Friends and Family: Not on file    Frequency of Social Gatherings with Friends and Family: Not on file    Attends Latter day Services: Not on file    Active Member of [address] [place] [address] or Organizations: Not on file    Attends Club or Organization Meetings: Not on file    Marital Status: Not on file   Intimate Partner Violence:     Fear of Current or Ex-Partner: Not on file    Emotionally Abused: Not on file    Physically Abused: Not on file    Sexually Abused: Not on file   Housing Stability:     Unable to Pay for Housing in the Last Year: Not on file    Number of [place] in the Last Year: Not on file    Unstable Housing in the Last Year: Not on file       No Known Allergies  Current Outpatient Medications   Medication Sig Dispense Refill    FLUoxetine (PROZAC) 10 MG capsule Take 1 capsule by mouth daily 30 capsule 3     No current facility-administered medications for this visit. Review of Systems   Constitutional: Positive for fatigue. Negative for activity change, chills, diaphoresis and fever. HENT: Negative for congestion. Respiratory: Negative for cough and wheezing. Cardiovascular: Negative for chest pain. Gastrointestinal: Positive for abdominal pain. Negative for change in bowel habit, nausea and vomiting. Musculoskeletal: Negative for joint swelling and myalgias. Neurological: Positive for dizziness and weakness. Negative for numbness and headaches. Psychiatric/Behavioral: Positive for agitation and sleep disturbance. The patient is nervous/anxious. Lab Results   Component Value Date    WBC 4.2 01/14/2022    HGB 15.4 01/14/2022    HCT 44.8 01/14/2022    MCV 94.7 01/14/2022     01/14/2022     Lab Results   Component Value Date     01/14/2022    K 4.3 01/14/2022     01/14/2022    CO2 22 01/14/2022    BUN 17 01/14/2022    CREATININE 0.9 01/14/2022    GLUCOSE 102 (H) 01/15/2021    CALCIUM 8.7 01/14/2022    PROT 6.8 01/14/2022    LABALBU 4.6 01/14/2022    BILITOT 0.5 01/14/2022    ALKPHOS 58 01/14/2022    AST 32 01/14/2022    ALT 25 01/14/2022    LABGLOM >60 01/14/2022    GFRAA >60 01/14/2022    AGRATIO 2.1 01/14/2022       /82 (Site: Left Upper Arm, Position: Sitting, Cuff Size: Large Adult)   Pulse 66   Wt 244 lb 9.6 oz (110.9 kg)   SpO2 97%   BMI 34.11 kg/m²     BP Readings from Last 3 Encounters:   01/12/22 114/82   01/03/22 110/80   09/01/21 (!) 94/51       Wt Readings from Last 3 Encounters:   01/12/22 244 lb 9.6 oz (110.9 kg)   01/03/22 247 lb 11.2 oz (112.4 kg)   08/18/21 250 lb (113.4 kg) (>99 %, Z= 2.38)*     * Growth percentiles are based on CDC (Boys, 2-20 Years) data. Physical Exam  Constitutional:       General: He is not in acute distress. Appearance: Normal appearance. He is well-developed. He is not ill-appearing or diaphoretic. HENT:      Head: Normocephalic and atraumatic. Mouth/Throat:      Mouth: Mucous membranes are moist.      Pharynx: No oropharyngeal exudate or posterior oropharyngeal erythema. Eyes:      General: No scleral icterus. Pupils: Pupils are equal, round, and reactive to light.    Cardiovascular:      Rate and Rhythm: Normal rate and regular rhythm. Heart sounds: Normal heart sounds. No murmur heard. Pulmonary:      Effort: Pulmonary effort is normal.      Breath sounds: Normal breath sounds. No wheezing. Musculoskeletal:         General: Normal range of motion. Cervical back: Normal range of motion and neck supple. No rigidity. Right lower leg: No edema. Left lower leg: No edema. Neurological:      General: No focal deficit present. Mental Status: He is alert and oriented to person, place, and time. Psychiatric:         Mood and Affect: Mood is anxious. Affect is flat. Speech: Speech normal.         Behavior: Behavior normal.         Thought Content: Thought content normal.         Cognition and Memory: Cognition normal.         Judgment: Judgment normal.         ASSESSMENT/ PLAN:    1. Fatigue, unspecified type  - Hemoglobin A1C; Future  - Comprehensive Metabolic Panel, Fasting; Future  - CBC Auto Differential; Future  - TSH without Reflex; Future  - T4, Free; Future    2. Reactive depression  - start:  - FLUoxetine (PROZAC) 10 MG capsule; Take 1 capsule by mouth daily  Dispense: 30 capsule; Refill: 3              - All old blood work reviewed with the patient  - Appropriate prescription are addressed. - After visit summery provided. - Questions answered and patient verbalizes understanding.  - Call for any problem, questions, or concerns. Return in about 6 weeks (around 2/23/2022).

## 2022-01-13 ENCOUNTER — TELEPHONE (OUTPATIENT)
Dept: FAMILY MEDICINE CLINIC | Age: 22
End: 2022-01-13

## 2022-01-14 DIAGNOSIS — R53.83 FATIGUE, UNSPECIFIED TYPE: ICD-10-CM

## 2022-01-14 LAB
A/G RATIO: 2.1 (ref 1.1–2.2)
ALBUMIN SERPL-MCNC: 4.6 G/DL (ref 3.4–5)
ALP BLD-CCNC: 58 U/L (ref 40–129)
ALT SERPL-CCNC: 25 U/L (ref 10–40)
ANION GAP SERPL CALCULATED.3IONS-SCNC: 13 MMOL/L (ref 3–16)
AST SERPL-CCNC: 32 U/L (ref 15–37)
BASOPHILS ABSOLUTE: 0 K/UL (ref 0–0.2)
BASOPHILS RELATIVE PERCENT: 0.6 %
BILIRUB SERPL-MCNC: 0.5 MG/DL (ref 0–1)
BUN BLDV-MCNC: 17 MG/DL (ref 7–20)
CALCIUM SERPL-MCNC: 8.7 MG/DL (ref 8.3–10.6)
CHLORIDE BLD-SCNC: 107 MMOL/L (ref 99–110)
CO2: 22 MMOL/L (ref 21–32)
CREAT SERPL-MCNC: 0.9 MG/DL (ref 0.9–1.3)
EOSINOPHILS ABSOLUTE: 0.4 K/UL (ref 0–0.6)
EOSINOPHILS RELATIVE PERCENT: 8.3 %
GFR AFRICAN AMERICAN: >60
GFR NON-AFRICAN AMERICAN: >60
GLUCOSE FASTING: 91 MG/DL (ref 70–99)
HCT VFR BLD CALC: 44.8 % (ref 40.5–52.5)
HEMOGLOBIN: 15.4 G/DL (ref 13.5–17.5)
LYMPHOCYTES ABSOLUTE: 1.7 K/UL (ref 1–5.1)
LYMPHOCYTES RELATIVE PERCENT: 39.6 %
MCH RBC QN AUTO: 32.6 PG (ref 26–34)
MCHC RBC AUTO-ENTMCNC: 34.4 G/DL (ref 31–36)
MCV RBC AUTO: 94.7 FL (ref 80–100)
MONOCYTES ABSOLUTE: 0.7 K/UL (ref 0–1.3)
MONOCYTES RELATIVE PERCENT: 17.3 %
NEUTROPHILS ABSOLUTE: 1.5 K/UL (ref 1.7–7.7)
NEUTROPHILS RELATIVE PERCENT: 34.2 %
PDW BLD-RTO: 13.1 % (ref 12.4–15.4)
PLATELET # BLD: 220 K/UL (ref 135–450)
PMV BLD AUTO: 8.4 FL (ref 5–10.5)
POTASSIUM SERPL-SCNC: 4.3 MMOL/L (ref 3.5–5.1)
RBC # BLD: 4.73 M/UL (ref 4.2–5.9)
SODIUM BLD-SCNC: 142 MMOL/L (ref 136–145)
T4 FREE: 1.2 NG/DL (ref 0.9–1.8)
TOTAL PROTEIN: 6.8 G/DL (ref 6.4–8.2)
TSH SERPL DL<=0.05 MIU/L-ACNC: 0.75 UIU/ML (ref 0.27–4.2)
WBC # BLD: 4.2 K/UL (ref 4–11)

## 2022-01-15 PROBLEM — R53.83 FATIGUE: Status: ACTIVE | Noted: 2022-01-15

## 2022-01-15 PROBLEM — F32.9 REACTIVE DEPRESSION: Status: ACTIVE | Noted: 2022-01-15

## 2022-01-15 LAB
ESTIMATED AVERAGE GLUCOSE: 93.9 MG/DL
HBA1C MFR BLD: 4.9 %

## 2022-01-15 ASSESSMENT — ENCOUNTER SYMPTOMS: WHEEZING: 0

## 2022-02-09 ENCOUNTER — TELEPHONE (OUTPATIENT)
Dept: FAMILY MEDICINE CLINIC | Age: 22
End: 2022-02-09

## 2022-02-11 ENCOUNTER — OFFICE VISIT (OUTPATIENT)
Dept: FAMILY MEDICINE CLINIC | Age: 22
End: 2022-02-11
Payer: COMMERCIAL

## 2022-02-11 VITALS
OXYGEN SATURATION: 98 % | BODY MASS INDEX: 34.42 KG/M2 | HEART RATE: 69 BPM | WEIGHT: 246.8 LBS | DIASTOLIC BLOOD PRESSURE: 78 MMHG | SYSTOLIC BLOOD PRESSURE: 114 MMHG

## 2022-02-11 DIAGNOSIS — R11.0 NAUSEA: ICD-10-CM

## 2022-02-11 DIAGNOSIS — F32.9 REACTIVE DEPRESSION: Primary | ICD-10-CM

## 2022-02-11 DIAGNOSIS — F17.200 TOBACCO USE DISORDER: ICD-10-CM

## 2022-02-11 PROCEDURE — G8427 DOCREV CUR MEDS BY ELIG CLIN: HCPCS | Performed by: FAMILY MEDICINE

## 2022-02-11 PROCEDURE — 99214 OFFICE O/P EST MOD 30 MIN: CPT | Performed by: FAMILY MEDICINE

## 2022-02-11 PROCEDURE — 4004F PT TOBACCO SCREEN RCVD TLK: CPT | Performed by: FAMILY MEDICINE

## 2022-02-11 PROCEDURE — G8484 FLU IMMUNIZE NO ADMIN: HCPCS | Performed by: FAMILY MEDICINE

## 2022-02-11 PROCEDURE — G8417 CALC BMI ABV UP PARAM F/U: HCPCS | Performed by: FAMILY MEDICINE

## 2022-02-11 RX ORDER — FLUOXETINE HYDROCHLORIDE 20 MG/1
20 CAPSULE ORAL DAILY
Qty: 30 CAPSULE | Refills: 5 | Status: SHIPPED | OUTPATIENT
Start: 2022-02-11 | End: 2022-08-05 | Stop reason: SDUPTHER

## 2022-02-11 RX ORDER — OMEPRAZOLE 20 MG/1
20 CAPSULE, DELAYED RELEASE ORAL DAILY
COMMUNITY

## 2022-02-11 ASSESSMENT — ENCOUNTER SYMPTOMS
COUGH: 0
NAUSEA: 1
SHORTNESS OF BREATH: 0
ABDOMINAL PAIN: 0
VOMITING: 0

## 2022-02-11 NOTE — PROGRESS NOTES
Jamil Raj  2000 02/11/22    Chief Complaint   Patient presents with   [address] [facility]     f/u regarding the depression           Patient here for 6 wks f/u regarding his depression, and to discuss the lab results, prozac was started patient noticed little better and feeling better, still has some nausea. Denies suicidal idea or plan. Nausea & Vomiting  Associated symptoms include fatigue (better) and nausea. Pertinent negatives include no abdominal pain, chest pain, chills, congestion, coughing, diaphoresis, fever, headaches, joint swelling, numbness or vomiting.        Past Medical History:   Diagnosis Date    Asthma     Follows with Dr. Dania Rodrigues Hx of blood clots 10/22/2019    PE (after ankle fx)     Past Surgical History:   Procedure Laterality Date    CARPAL TUNNEL RELEASE Left 07/2020    CHOLECYSTECTOMY, LAPAROSCOPIC N/A 10/20/2020    CHOLECYSTECTOMY LAPAROSCOPIC performed by Robyn Santizo MD at [address] [place] [address]    UPPER GASTROINTESTINAL ENDOSCOPY N/A 9/1/2021    EGD BIOPSY performed by Robyn Santizo MD at [facility] ENDOSCOPY     Family History   Problem Relation Age of Onset    High Cholesterol Mother     Cancer Paternal Grandfather      Social History     Socioeconomic History    Marital status: Single     Spouse name: Not on file    Number of children: Not on file    Years of education: Not on file    Highest education level: Not on file   Occupational History    Not on file   Tobacco Use    Smoking status: Current Every Day Smoker     Packs/day: 0.50     Years: 1.00     Pack years: 0.50     Types: Cigarettes     Start date: 2019    Smokeless tobacco: Never Used   Vaping Use    Vaping Use: Every day    Substances: Nicotine, Flavoring    Devices: Pre-filled or refillable cartridge, Pre-filled pod   Substance and Sexual Activity    Alcohol use: No    Drug use: Not Currently     Types: Marijuana Mahendra Don)     Comment: Last used 8/2020    Sexual activity: Never   Other Topics Concern    Not on file   Social History Narrative    ** Merged History Encounter **          Social Determinants of Health     Financial Resource Strain:     Difficulty of Paying Living Expenses: Not on file   Food Insecurity:     Worried About Running Out of Food in the Last Year: Not on file    [place] of Food in the Last Year: Not on file   Transportation Needs:     Lack of Transportation (Medical): Not on file    Lack of Transportation (Non-Medical): Not on file   Physical Activity:     Days of Exercise per Week: Not on file    Minutes of Exercise per Session: Not on file   Stress:     Feeling of Stress : Not on file   Social Connections:     Frequency of Communication with Friends and Family: Not on file    Frequency of Social Gatherings with Friends and Family: Not on file    Attends Anabaptism Services: Not on file    Active Member of [address] [place] [address] [email or web address] or Organizations: Not on file    Attends Club or Organization Meetings: Not on file    Marital Status: Not on file   Intimate Partner Violence:     Fear of Current or Ex-Partner: Not on file    Emotionally Abused: Not on file    Physically Abused: Not on file    Sexually Abused: Not on file   Housing Stability:     Unable to Pay for Housing in the Last Year: Not on file    Number of [place] in the Last Year: Not on file    Unstable Housing in the Last Year: Not on file       No Known Allergies  Current Outpatient Medications   Medication Sig Dispense Refill    omeprazole (PRILOSEC) 20 MG delayed release capsule Take 20 mg by mouth daily      FLUoxetine (PROZAC) 20 MG capsule Take 1 capsule by mouth daily 30 capsule 5     No current facility-administered medications for this visit. Review of Systems   Constitutional: Positive for fatigue (better). Negative for activity change, chills, diaphoresis and fever. HENT: Negative for congestion. Respiratory: Negative for cough and shortness of breath. Cardiovascular: Negative for chest pain. Gastrointestinal: Positive for nausea. Negative for abdominal pain and vomiting. Musculoskeletal: Negative for joint swelling. Neurological: Positive for dizziness (better). Negative for numbness and headaches. Psychiatric/Behavioral: Positive for agitation and sleep disturbance. Negative for self-injury and suicidal ideas. The patient is nervous/anxious. All symptoms better       Lab Results   Component Value Date    WBC 4.2 01/14/2022    HGB 15.4 01/14/2022    HCT 44.8 01/14/2022    MCV 94.7 01/14/2022     01/14/2022     Lab Results   Component Value Date     01/14/2022    K 4.3 01/14/2022     01/14/2022    CO2 22 01/14/2022    BUN 17 01/14/2022    CREATININE 0.9 01/14/2022    GLUCOSE 102 (H) 01/15/2021    CALCIUM 8.7 01/14/2022    PROT 6.8 01/14/2022    LABALBU 4.6 01/14/2022    BILITOT 0.5 01/14/2022    ALKPHOS 58 01/14/2022    AST 32 01/14/2022    ALT 25 01/14/2022    LABGLOM >60 01/14/2022    GFRAA >60 01/14/2022    AGRATIO 2.1 01/14/2022       Lab Results   Component Value Date    LABA1C 4.9 01/14/2022     Lab Results   Component Value Date    TSH 0.75 01/14/2022         /78 (Site: Left Upper Arm, Position: Sitting, Cuff Size: Large Adult)   Pulse 69   Wt 246 lb 12.8 oz (111.9 kg)   SpO2 98%   BMI 34.42 kg/m²     BP Readings from Last 3 Encounters:   02/11/22 114/78   01/12/22 114/82   01/03/22 110/80       Wt Readings from Last 3 Encounters:   02/11/22 246 lb 12.8 oz (111.9 kg)   01/12/22 244 lb 9.6 oz (110.9 kg)   01/03/22 247 lb 11.2 oz (112.4 kg)         Physical Exam  Constitutional:       General: He is not in acute distress. Appearance: Normal appearance. He is well-developed. He is not ill-appearing or diaphoretic. HENT:      Head: Normocephalic and atraumatic. Eyes:      General: No scleral icterus. Pupils: Pupils are equal, round, and reactive to light.    Cardiovascular:      Rate and Rhythm: Normal

## 2022-02-21 ENCOUNTER — TELEPHONE (OUTPATIENT)
Dept: FAMILY MEDICINE CLINIC | Age: 22
End: 2022-02-21

## 2022-03-01 NOTE — TELEPHONE ENCOUNTER
Called and patient was still at work. 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 Please advise.

## 2022-03-15 ENCOUNTER — TELEPHONE (OUTPATIENT)
Dept: FAMILY MEDICINE CLINIC | Age: 22
End: 2022-03-15

## 2022-03-16 NOTE — PROGRESS NOTES
.Surgery 10/20/20 @ 1230, arrival 1030               1. Do not eat or drink anything within 8 hours of your surgery - unless instructed by your doctor prior to surgery. This includes                   no water, chewing gum or mints. 2. Follow your directions as prescribed by the doctor for your procedure and medications. Take Omeprazole in am with a sip. 3. Check with your Doctor regarding stopping Plavix, Coumadin, Lovenox,Effient,Pradaxa,Xarelto, Fragmin or other blood thinners and                   follow their instructions. 4. Do not smoke, and do not drink any alcoholic beverages 24 hours prior to surgery. This includes NA Beer. 5. You may brush your teeth and gargle the morning of surgery. DO NOT SWALLOW WATER   6. You MUST make arrangements for a responsible adult to take you home after your surgery and be able to check on you every couple                   hours for the day. You will not be allowed to leave alone or drive yourself home. It is strongly suggested someone stay with you the first 24                   hrs. Your surgery will be cancelled if you do not have a ride home. 7. Please wear simple, loose fitting clothing to the hospital.  Izaiah Starr not bring valuables (money, credit cards, checkbooks, etc.) Do not wear any                   makeup (including no eye makeup) or nail polish on your fingers or toes. 8. DO NOT wear any jewelry or piercings on day of surgery. All body piercing jewelry must be removed. 9. If you have dentures, they will be removed before going to the OR; we will provide you a container. If you wear contact lenses or glasses,                  they will be removed; please bring a case for them. 10. If you  have a Living Will and Durable Power of  for Healthcare, please bring in a copy. 11. Please bring picture ID,  insurance card, paperwork from the doctors office    (H & P, Consent, & card for implantable devices). Final Anesthesia Post-op Assessment    Patient: Arlene Perales  Procedure(s) Performed: RIGHT THUMB LIGAMENT RECONSTRUCTION TENDON INTERPOSITION - RIGHT  Anesthesia type: General    Vitals Value Taken Time   Temp 36.7 °C (98 °F) 03/16/22 1145   Pulse 66 03/16/22 1145   Resp 16 03/16/22 1145   SpO2 96 % 03/16/22 1145   /77 03/16/22 1145         Patient Location: Phase II  Post-op Vital Signs:stable  Level of Consciousness: participates in exam, awake, oriented, answers questions appropriately and alert  Respiratory Status: spontaneous ventilation and room air  Cardiovascular blood pressure returned to baseline  Hydration: euvolemic  Pain Management: well controlled  Nausea: None  Airway Patency:patent  Post-op Assessment: awake, alert, appropriately conversant, or baseline, no complications, patient tolerated procedure well with no complications and no evidence of recall  Comments: Patient has been evaluated. Patient is stable, spontaneously breathing, responsive, recovered from anesthesia.      No complications documented.    12. Take a shower the night before or morning of your procedure, do not apply any lotion, oil or powder. 13. Wear a mask covering your nose & mouth when entering the hospital. Have your covid-19 test performed within 10 days of your                  Surgery - Patient states is to have a Rapid Covid per Dr. Safia Zayas on 10/20/20. Paredes Bee yourself after the test until after your surgery.

## 2022-03-21 ENCOUNTER — TELEPHONE (OUTPATIENT)
Dept: FAMILY MEDICINE CLINIC | Age: 22
End: 2022-03-21

## 2022-04-22 ENCOUNTER — TELEPHONE (OUTPATIENT)
Dept: FAMILY MEDICINE CLINIC | Age: 22
End: 2022-04-22

## 2022-06-20 ENCOUNTER — TELEPHONE (OUTPATIENT)
Dept: FAMILY MEDICINE CLINIC | Age: 22
End: 2022-06-20

## 2022-06-23 ENCOUNTER — TELEPHONE (OUTPATIENT)
Dept: FAMILY MEDICINE CLINIC | Age: 22
End: 2022-06-23

## 2022-08-05 ENCOUNTER — OFFICE VISIT (OUTPATIENT)
Dept: FAMILY MEDICINE CLINIC | Age: 22
End: 2022-08-05
Payer: COMMERCIAL

## 2022-08-05 VITALS
SYSTOLIC BLOOD PRESSURE: 118 MMHG | OXYGEN SATURATION: 98 % | WEIGHT: 244 LBS | HEART RATE: 67 BPM | BODY MASS INDEX: 34.03 KG/M2 | DIASTOLIC BLOOD PRESSURE: 82 MMHG

## 2022-08-05 DIAGNOSIS — R61 EXCESSIVE SWEATING: ICD-10-CM

## 2022-08-05 DIAGNOSIS — F32.9 REACTIVE DEPRESSION: ICD-10-CM

## 2022-08-05 DIAGNOSIS — F41.9 ANXIETY AND DEPRESSION: Primary | ICD-10-CM

## 2022-08-05 DIAGNOSIS — F32.A ANXIETY AND DEPRESSION: Primary | ICD-10-CM

## 2022-08-05 DIAGNOSIS — L84 CALLUS: ICD-10-CM

## 2022-08-05 PROCEDURE — G8427 DOCREV CUR MEDS BY ELIG CLIN: HCPCS | Performed by: FAMILY MEDICINE

## 2022-08-05 PROCEDURE — G8417 CALC BMI ABV UP PARAM F/U: HCPCS | Performed by: FAMILY MEDICINE

## 2022-08-05 PROCEDURE — 4004F PT TOBACCO SCREEN RCVD TLK: CPT | Performed by: FAMILY MEDICINE

## 2022-08-05 PROCEDURE — 99214 OFFICE O/P EST MOD 30 MIN: CPT | Performed by: FAMILY MEDICINE

## 2022-08-05 RX ORDER — CHOLESTYRAMINE 4 G/9G
POWDER, FOR SUSPENSION ORAL
COMMUNITY
Start: 2022-05-24

## 2022-08-05 RX ORDER — FLUOXETINE 10 MG/1
30 CAPSULE ORAL DAILY
Qty: 90 CAPSULE | Refills: 5 | Status: SHIPPED | OUTPATIENT
Start: 2022-08-05 | End: 2022-10-07 | Stop reason: SDUPTHER

## 2022-08-05 SDOH — ECONOMIC STABILITY: FOOD INSECURITY: WITHIN THE PAST 12 MONTHS, YOU WORRIED THAT YOUR FOOD WOULD RUN OUT BEFORE YOU GOT MONEY TO BUY MORE.: PATIENT DECLINED

## 2022-08-05 SDOH — ECONOMIC STABILITY: FOOD INSECURITY: WITHIN THE PAST 12 MONTHS, THE FOOD YOU BOUGHT JUST DIDN'T LAST AND YOU DIDN'T HAVE MONEY TO GET MORE.: PATIENT DECLINED

## 2022-08-05 ASSESSMENT — SOCIAL DETERMINANTS OF HEALTH (SDOH): HOW HARD IS IT FOR YOU TO PAY FOR THE VERY BASICS LIKE FOOD, HOUSING, MEDICAL CARE, AND HEATING?: PATIENT DECLINED

## 2022-08-05 ASSESSMENT — ENCOUNTER SYMPTOMS
SHORTNESS OF BREATH: 0
COUGH: 0
ABDOMINAL PAIN: 0

## 2022-08-05 NOTE — PROGRESS NOTES
Helio Schmid  2000 08/05/22    Chief Complaint   Patient presents with    Follow-up     Pt would like to discuss increase in depression medications, pt says he is doing good for the most part but still struggling. Other     Pt reports excessive sweating, pt is a  and it poses a hazard to him in the work place            Patient here for a follow-up visit regarding his anxiety and depression, did increase his the Prozac last time to 20 which is it did help him, but he wants to increase the dose because he still not feeling completely not depressed. Denies suicidal idea or plan. Patient also complaining of excessive sweating, and this is going on for long time.       Past Medical History:   Diagnosis Date    Asthma     Follows with Dr. Jo-Ann Harley    Hx of blood clots 10/22/2019    PE (after ankle fx)     Past Surgical History:   Procedure Laterality Date    CARPAL TUNNEL RELEASE Left 07/2020    CHOLECYSTECTOMY, LAPAROSCOPIC N/A 10/20/2020    CHOLECYSTECTOMY LAPAROSCOPIC performed by Yelena Pina MD at [place] [id number]  2011    UPPER GASTROINTESTINAL ENDOSCOPY N/A 9/1/2021    EGD BIOPSY performed by Yelena Pina MD at [facility] ENDOSCOPY     Family History   Problem Relation Age of Onset    High Cholesterol Mother     Cancer Paternal Grandfather      Social History     Socioeconomic History    Marital status: Single     Spouse name: Not on file    Number of children: Not on file    Years of education: Not on file    Highest education level: Not on file   Occupational History    Not on file   Tobacco Use    Smoking status: Every Day     Packs/day: 0.50     Years: 1.00     Pack years: 0.50     Types: Cigarettes     Start date: 2019    Smokeless tobacco: Never   Vaping Use    Vaping Use: Every day    Substances: Nicotine, Flavoring    Devices: Pre-filled or refillable cartridge, Pre-filled pod   Substance and Sexual Activity    Alcohol use: No    Drug use: Not Currently Types: Marijuana (Jetty Shell)     Comment: Last used 8/2020    Sexual activity: Never   Other Topics Concern    Not on file   Social History Narrative    ** Merged History Encounter **          Social Determinants of Health     Financial Resource Strain: Unknown    Difficulty of Paying Living Expenses: Patient refused   Food Insecurity: Unknown    Worried About Running Out of Food in the Last Year: Patient refused    Ran Out of Food in the Last Year: Patient refused   Transportation Needs: Not on file   Physical Activity: Not on file   Stress: Not on file   Social Connections: Not on file   Intimate Partner Violence: Not on file   Housing Stability: Not on file       No Known Allergies  Current Outpatient Medications   Medication Sig Dispense Refill    cholestyramine (QUESTRAN) 4 g packet TAKE 1 PACKET BY MOUTH 2 TIMES DAILY      aluminum chloride (DRYSOL) 20 % external solution Apply topically nightly. 1 each 3    FLUoxetine (PROZAC) 10 MG capsule Take 3 capsules by mouth in the morning. 90 capsule 5    omeprazole (PRILOSEC) 20 MG delayed release capsule Take 20 mg by mouth daily       No current facility-administered medications for this visit. Review of Systems   Constitutional:  Positive for fatigue (better). Negative for activity change, chills, diaphoresis and fever. HENT:  Negative for congestion. Respiratory:  Negative for cough and shortness of breath. Cardiovascular:  Negative for chest pain. Gastrointestinal:  Negative for abdominal pain. Musculoskeletal:  Negative for joint swelling. Skin:         callus formation of both feet   Neurological:  Positive for dizziness (better). Negative for numbness and headaches. Psychiatric/Behavioral:  Positive for agitation and sleep disturbance. Negative for self-injury and suicidal ideas. The patient is nervous/anxious.          All symptoms better     Lab Results   Component Value Date    WBC 4.2 01/14/2022    HGB 15.4 01/14/2022    HCT 44.8 01/14/2022    MCV 94.7 01/14/2022     01/14/2022     Lab Results   Component Value Date     01/14/2022    K 4.3 01/14/2022     01/14/2022    CO2 22 01/14/2022    BUN 17 01/14/2022    CREATININE 0.9 01/14/2022    GLUCOSE 102 (H) 01/15/2021    CALCIUM 8.7 01/14/2022    PROT 6.8 01/14/2022    LABALBU 4.6 01/14/2022    BILITOT 0.5 01/14/2022    ALKPHOS 58 01/14/2022    AST 32 01/14/2022    ALT 25 01/14/2022    LABGLOM >60 01/14/2022    GFRAA >60 01/14/2022    AGRATIO 2.1 01/14/2022     No results found for: CHOL  No results found for: TRIG  No results found for: HDL  No results found for: LDLCALC, LDLCHOLESTEROL  Lab Results   Component Value Date    LABA1C 4.9 01/14/2022     Lab Results   Component Value Date    TSH 0.75 01/14/2022         /82 (Site: Left Upper Arm, Position: Sitting, Cuff Size: Medium Adult)   Pulse 67   Wt 244 lb (110.7 kg)   SpO2 98%   BMI 34.03 kg/m²     BP Readings from Last 3 Encounters:   08/05/22 118/82   02/11/22 114/78   01/12/22 114/82       Wt Readings from Last 3 Encounters:   08/05/22 244 lb (110.7 kg)   02/11/22 246 lb 12.8 oz (111.9 kg)   01/12/22 244 lb 9.6 oz (110.9 kg)         Physical Exam  Constitutional:       General: He is not in acute distress. Appearance: Normal appearance. He is well-developed. He is not ill-appearing or diaphoretic. HENT:      Head: Normocephalic and atraumatic. Eyes:      General: No scleral icterus. Pupils: Pupils are equal, round, and reactive to light. Cardiovascular:      Rate and Rhythm: Normal rate and regular rhythm. Heart sounds: Normal heart sounds. No murmur heard. Pulmonary:      Effort: Pulmonary effort is normal.      Breath sounds: Normal breath sounds. No wheezing. Musculoskeletal:         General: Normal range of motion. Cervical back: Normal range of motion and neck supple. No rigidity. Right lower leg: No edema. Left lower leg: No edema.    Skin:     Comments: Callus formation on both feet   Neurological:      General: No focal deficit present. Mental Status: He is alert and oriented to person, place, and time. Psychiatric:         Mood and Affect: Mood is not anxious. Speech: Speech normal.         Behavior: Behavior normal.         Thought Content: Thought content normal.         Cognition and Memory: Cognition normal.         Judgment: Judgment normal.       ASSESSMENT/ PLAN:    1. Anxiety and depression  -Still not feeling completely well so we will increase dose of Prozac to 30 mg  - FLUoxetine (PROZAC) 10 MG capsule; Take 3 capsules by mouth in the morning. Dispense: 90 capsule; Refill: 5  -Denies suicidal idea or plan  2. Reactive depression  -As above  - FLUoxetine (PROZAC) 10 MG capsule; Take 3 capsules by mouth in the morning. Dispense: 90 capsule; Refill: 5    3. Excessive sweating  -We will retry the aluminum chloride solution  - aluminum chloride (DRYSOL) 20 % external solution; Apply topically nightly. Dispense: 1 each; Refill: 3  -If it did not work we will send him to a dermatologist  4. Callus  -He has callus formation for both feet so would like to see a podiatry  - Amb External Referral To Podiatry            - All old blood work reviewed with the patient  - Appropriate prescription are addressed. - After visit summery provided. - Questions answered and patient verbalizes understanding.  - Call for any problem, questions, or concerns.  - RTC if symptoms worse. Return in about 2 months (around 10/5/2022).

## 2022-08-10 ENCOUNTER — TELEPHONE (OUTPATIENT)
Dept: FAMILY MEDICINE CLINIC | Age: 22
End: 2022-08-10

## 2022-08-12 ENCOUNTER — TELEPHONE (OUTPATIENT)
Dept: FAMILY MEDICINE CLINIC | Age: 22
End: 2022-08-12

## 2022-08-22 DIAGNOSIS — R61 EXCESSIVE SWEATING: ICD-10-CM

## 2022-09-23 DIAGNOSIS — F32.9 REACTIVE DEPRESSION: ICD-10-CM

## 2022-09-23 RX ORDER — FLUOXETINE HYDROCHLORIDE 20 MG/1
CAPSULE ORAL
Qty: 30 CAPSULE | Refills: 5 | OUTPATIENT
Start: 2022-09-23

## 2022-10-07 ENCOUNTER — OFFICE VISIT (OUTPATIENT)
Dept: FAMILY MEDICINE CLINIC | Age: 22
End: 2022-10-07
Payer: COMMERCIAL

## 2022-10-07 VITALS
WEIGHT: 246.2 LBS | SYSTOLIC BLOOD PRESSURE: 124 MMHG | HEART RATE: 79 BPM | DIASTOLIC BLOOD PRESSURE: 80 MMHG | OXYGEN SATURATION: 98 % | BODY MASS INDEX: 34.34 KG/M2

## 2022-10-07 DIAGNOSIS — F32.9 REACTIVE DEPRESSION: ICD-10-CM

## 2022-10-07 DIAGNOSIS — F32.A ANXIETY AND DEPRESSION: Primary | ICD-10-CM

## 2022-10-07 DIAGNOSIS — F41.9 ANXIETY AND DEPRESSION: Primary | ICD-10-CM

## 2022-10-07 PROCEDURE — 99214 OFFICE O/P EST MOD 30 MIN: CPT | Performed by: FAMILY MEDICINE

## 2022-10-07 PROCEDURE — G8417 CALC BMI ABV UP PARAM F/U: HCPCS | Performed by: FAMILY MEDICINE

## 2022-10-07 PROCEDURE — 4004F PT TOBACCO SCREEN RCVD TLK: CPT | Performed by: FAMILY MEDICINE

## 2022-10-07 PROCEDURE — G8427 DOCREV CUR MEDS BY ELIG CLIN: HCPCS | Performed by: FAMILY MEDICINE

## 2022-10-07 PROCEDURE — G8484 FLU IMMUNIZE NO ADMIN: HCPCS | Performed by: FAMILY MEDICINE

## 2022-10-07 RX ORDER — FLUOXETINE HYDROCHLORIDE 40 MG/1
40 CAPSULE ORAL DAILY
Qty: 30 CAPSULE | Refills: 5 | Status: SHIPPED | OUTPATIENT
Start: 2022-10-07

## 2022-10-07 ASSESSMENT — ENCOUNTER SYMPTOMS
ABDOMINAL PAIN: 0
COUGH: 0
SHORTNESS OF BREATH: 0

## 2022-10-07 NOTE — PROGRESS NOTES
Zan Lange  2000  10/07/22    Chief Complaint   Patient presents with    Other     Patient here for 2 mo F/U regarding anxiety and depression           Patient here for 2 months  follow-up visit regarding his anxiety and depression, did increase his the Prozac last time to 30 which did help him, but he wants to increase the dose. Denies suicidal idea or plan.         Past Medical History:   Diagnosis Date    Asthma     Follows with Dr. Jennifer Nesbitt    Hx of blood clots 10/22/2019    PE (after ankle fx)     Past Surgical History:   Procedure Laterality Date    CARPAL TUNNEL RELEASE Left 07/2020    CHOLECYSTECTOMY, LAPAROSCOPIC N/A 10/20/2020    CHOLECYSTECTOMY LAPAROSCOPIC performed by Claire Slaughter MD at [address]  2011    UPPER GASTROINTESTINAL ENDOSCOPY N/A 9/1/2021    EGD BIOPSY performed by Claire Slaughter MD at [address] [facility] ENDOSCOPY     Family History   Problem Relation Age of Onset    High Cholesterol Mother     Cancer Paternal Grandfather      Social History     Socioeconomic History    Marital status: Single     Spouse name: Not on file    Number of children: Not on file    Years of education: Not on file    Highest education level: Not on file   Occupational History    Not on file   Tobacco Use    Smoking status: Every Day     Packs/day: 0.50     Years: 1.00     Pack years: 0.50     Types: Cigarettes     Start date: 2019    Smokeless tobacco: Never   Vaping Use    Vaping Use: Every day    Substances: Nicotine, Flavoring    Devices: Pre-filled or refillable cartridge, Pre-filled pod   Substance and Sexual Activity    Alcohol use: No    Drug use: Not Currently     Types: Marijuana Nan Abram)     Comment: Last used 8/2020    Sexual activity: Never   Other Topics Concern    Not on file   Social History Narrative    ** Merged History Encounter **          Social Determinants of Health     Financial Resource Strain: Unknown    Difficulty of Paying Living Expenses: Patient refused   Food Insecurity: Unknown    Worried About Running Out of Food in the Last Year: Patient refused    Ran Out of Food in the Last Year: Patient refused   Transportation Needs: Not on file   Physical Activity: Not on file   Stress: Not on file   Social Connections: Not on file   Intimate Partner Violence: Not on file   Housing Stability: Not on file       No Known Allergies  Current Outpatient Medications   Medication Sig Dispense Refill    FLUoxetine (PROZAC) 40 MG capsule Take 1 capsule by mouth daily 30 capsule 5    cholestyramine (QUESTRAN) 4 g packet TAKE 1 PACKET BY MOUTH 2 TIMES DAILY      omeprazole (PRILOSEC) 20 MG delayed release capsule Take 20 mg by mouth daily       No current facility-administered medications for this visit. Review of Systems   Constitutional:  Positive for fatigue (better). Negative for activity change, chills, diaphoresis and fever. HENT:  Negative for congestion. Respiratory:  Negative for cough and shortness of breath. Cardiovascular:  Negative for chest pain. Gastrointestinal:  Negative for abdominal pain. Musculoskeletal:  Negative for joint swelling. Skin:         callus formation of both feet   Neurological:  Negative for dizziness and headaches. Psychiatric/Behavioral:  Positive for agitation and sleep disturbance. Negative for self-injury and suicidal ideas. The patient is nervous/anxious.          All symptoms better     Lab Results   Component Value Date    WBC 4.2 01/14/2022    HGB 15.4 01/14/2022    HCT 44.8 01/14/2022    MCV 94.7 01/14/2022     01/14/2022     Lab Results   Component Value Date     01/14/2022    K 4.3 01/14/2022     01/14/2022    CO2 22 01/14/2022    BUN 17 01/14/2022    CREATININE 0.9 01/14/2022    GLUCOSE 102 (H) 01/15/2021    CALCIUM 8.7 01/14/2022    PROT 6.8 01/14/2022    LABALBU 4.6 01/14/2022    BILITOT 0.5 01/14/2022    ALKPHOS 58 01/14/2022    AST 32 01/14/2022    ALT 25 01/14/2022    LABGLOM >60 01/14/2022    GFRAA >60 01/14/2022    AGRATIO 2.1 01/14/2022     No results found for: CHOL  No results found for: TRIG  No results found for: HDL  No results found for: [facility], LDLCHOLESTEROL  Lab Results   Component Value Date    LABA1C 4.9 01/14/2022     Lab Results   Component Value Date    TSH 0.75 01/14/2022         /80 (Site: Left Upper Arm, Position: Sitting, Cuff Size: Large Adult)   Pulse 79   Wt 246 lb 3.2 oz (111.7 kg)   SpO2 98%   BMI 34.34 kg/m²     BP Readings from Last 3 Encounters:   10/07/22 124/80   08/05/22 118/82   02/11/22 114/78       Wt Readings from Last 3 Encounters:   10/07/22 246 lb 3.2 oz (111.7 kg)   08/05/22 244 lb (110.7 kg)   02/11/22 246 lb 12.8 oz (111.9 kg)         Physical Exam  Constitutional:       General: He is not in acute distress. Appearance: Normal appearance. He is well-developed. He is not ill-appearing or diaphoretic. HENT:      Head: Normocephalic and atraumatic. Eyes:      General: No scleral icterus. Pupils: Pupils are equal, round, and reactive to light. Cardiovascular:      Rate and Rhythm: Normal rate and regular rhythm. Heart sounds: Normal heart sounds. No murmur heard. Pulmonary:      Effort: Pulmonary effort is normal.      Breath sounds: Normal breath sounds. No wheezing. Musculoskeletal:         General: Normal range of motion. Cervical back: Normal range of motion and neck supple. No rigidity. Right lower leg: No edema. Left lower leg: No edema. Neurological:      General: No focal deficit present. Mental Status: He is alert and oriented to person, place, and time. Psychiatric:         Mood and Affect: Mood normal. Mood is not anxious. Speech: Speech normal.         Behavior: Behavior normal.         Thought Content: Thought content normal.         Cognition and Memory: Cognition normal.         Judgment: Judgment normal.       ASSESSMENT/ PLAN:    1.  Anxiety and depression  -We did increase his of Prozac last visit to 30 mg patient would like to be increased since then he is doing much better  - FLUoxetine (PROZAC) 40 MG capsule; Take 1 capsule by mouth daily  Dispense: 30 capsule; Refill: 5  -Denies any suicidal idea or plan    2. Reactive depression  - FLUoxetine (PROZAC) 40 MG capsule; Take 1 capsule by mouth daily  Dispense: 30 capsule; Refill: 11            - All old blood work reviewed with the patient  - Appropriate prescription are addressed. - After visit summery provided. - Questions answered and patient verbalizes understanding.  - Call for any problem, questions, or concerns. Return in about 3 months (around 1/7/2023).

## 2022-11-07 DIAGNOSIS — F41.9 ANXIETY AND DEPRESSION: ICD-10-CM

## 2022-11-07 DIAGNOSIS — F32.A ANXIETY AND DEPRESSION: ICD-10-CM

## 2022-11-07 DIAGNOSIS — F32.9 REACTIVE DEPRESSION: ICD-10-CM

## 2022-11-08 RX ORDER — FLUOXETINE HYDROCHLORIDE 40 MG/1
40 CAPSULE ORAL DAILY
Qty: 30 CAPSULE | Refills: 5 | Status: SHIPPED | OUTPATIENT
Start: 2022-11-08

## 2022-12-17 ENCOUNTER — HOSPITAL ENCOUNTER (EMERGENCY)
Age: 22
Discharge: HOME | End: 2022-12-17
Attending: EMERGENCY MEDICINE
Payer: COMMERCIAL

## 2022-12-17 VITALS
BODY MASS INDEX: 35 KG/M2 | SYSTOLIC BLOOD PRESSURE: 128 MMHG | WEIGHT: 250 LBS | OXYGEN SATURATION: 96 % | TEMPERATURE: 98 F | RESPIRATION RATE: 16 BRPM | HEIGHT: 71 IN | HEART RATE: 70 BPM | DIASTOLIC BLOOD PRESSURE: 96 MMHG

## 2022-12-17 DIAGNOSIS — M25.561 RIGHT KNEE PAIN, UNSPECIFIED CHRONICITY: Primary | ICD-10-CM

## 2022-12-17 PROCEDURE — 99283 EMERGENCY DEPT VISIT LOW MDM: CPT | Performed by: EMERGENCY MEDICINE

## 2022-12-17 RX ORDER — ACETAMINOPHEN 325 MG/1
650 TABLET ORAL EVERY 6 HOURS PRN
Qty: 120 TABLET | Refills: 3 | Status: SHIPPED | OUTPATIENT
Start: 2022-12-17

## 2022-12-17 RX ORDER — NAPROXEN 500 MG/1
500 TABLET ORAL 2 TIMES DAILY
Qty: 60 TABLET | Refills: 0 | Status: SHIPPED | OUTPATIENT
Start: 2022-12-17

## 2022-12-17 ASSESSMENT — ENCOUNTER SYMPTOMS
RESPIRATORY NEGATIVE: 1
ALLERGIC/IMMUNOLOGIC NEGATIVE: 1
EYES NEGATIVE: 1
GASTROINTESTINAL NEGATIVE: 1

## 2022-12-17 ASSESSMENT — PAIN SCALES - GENERAL: PAINLEVEL_OUTOF10: 3

## 2022-12-17 ASSESSMENT — PAIN DESCRIPTION - ORIENTATION: ORIENTATION: RIGHT

## 2022-12-17 ASSESSMENT — PAIN DESCRIPTION - LOCATION: LOCATION: LEG

## 2022-12-17 ASSESSMENT — PAIN DESCRIPTION - DESCRIPTORS: DESCRIPTORS: SHARP

## 2022-12-17 NOTE — Clinical Note
Lizzie Taylor was seen and treated in our emergency department on 12/17/2022. He may return to work on 12/19/2022. If you have any questions or concerns, please don't hesitate to call.       Howie Nelson, DO

## 2022-12-17 NOTE — ED PROVIDER NOTES
[address] [facility]      TRIAGE CHIEF COMPLAINT:   Leg Pain (Right since last Tuesay, seen at [facility] yesterday progressing gotten worse)      [place]:  Ed Ibarra is a 25 y.o. male that presents with complaint of right knee pain. Patient states on Tuesday he was sitting crosslegged and he went to stand up and heard a crack in his right knee. Ever since and having intermittent pain worse with positions, better with some positions, worse with others. Was seen at [facility] yesterday had x-ray that he pulled up on his phone from my chart reports said no acute osseous abnormality of right knee. Patient states he is here today because he still has some pain from time to time. He is requesting an MRI. He denies any fevers nausea vomiting chest pain shortness of breath weakness numbness or tingling no other injury or trauma. No other questions or concerns. No swelling. Able to ambulate. REVIEW OF SYSTEMS:  At least 10 systems reviewed and otherwise acutely negative except as in the [address] Review of Systems   Constitutional: Negative. HENT: Negative. Eyes: Negative. Respiratory: Negative. Cardiovascular: Negative. Gastrointestinal: Negative. Endocrine: Negative. Genitourinary: Negative. Musculoskeletal:  Positive for arthralgias and myalgias. Skin: Negative. Allergic/Immunologic: Negative. Neurological: Negative. Hematological: Negative. Psychiatric/Behavioral: Negative. All other systems reviewed and are negative.     Past Medical History:   Diagnosis Date    Asthma     Follows with Dr. Angelica Stallings    Hx of blood clots 10/22/2019    PE (after ankle fx)     Past Surgical History:   Procedure Laterality Date    CARPAL TUNNEL RELEASE Left 07/2020    CHOLECYSTECTOMY, LAPAROSCOPIC N/A 10/20/2020    CHOLECYSTECTOMY LAPAROSCOPIC performed by Declan Buck MD at [address] [place] [address] ENDOSCOPY N/A 9/1/2021    EGD BIOPSY performed by Anna Anna MD at [facility] ENDOSCOPY     Family History   Problem Relation Age of Onset    High Cholesterol Mother     Cancer Paternal Grandfather      Social History     Socioeconomic History    Marital status: Single     Spouse name: Not on file    Number of children: Not on file    Years of education: Not on file    Highest education level: Not on file   Occupational History    Not on file   Tobacco Use    Smoking status: Every Day     Packs/day: 0.50     Years: 1.00     Pack years: 0.50     Types: Cigarettes     Start date: 2019    Smokeless tobacco: Never   Vaping Use    Vaping Use: Every day    Substances: Nicotine, Flavoring    Devices: Pre-filled or refillable cartridge, Pre-filled pod   Substance and Sexual Activity    Alcohol use: No    Drug use: Not Currently     Types: Marijuana Garon Roddy)     Comment: Last used 8/2020    Sexual activity: Never   Other Topics Concern    Not on file   Social History Narrative    ** Merged History Encounter **          Social Determinants of Health     Financial Resource Strain: Unknown    Difficulty of Paying Living Expenses: Patient refused   Food Insecurity: Unknown    Worried About Running Out of Food in the Last Year: Patient refused    Ran Out of Food in the Last Year: Patient refused   Transportation Needs: Not on file   Physical Activity: Not on file   Stress: Not on file   Social Connections: Not on file   Intimate Partner Violence: Not on file   Housing Stability: Not on file     No current facility-administered medications for this encounter.      Current Outpatient Medications   Medication Sig Dispense Refill    naproxen (NAPROSYN) 500 MG tablet Take 1 tablet by mouth 2 times daily 60 tablet 0    acetaminophen (TYLENOL) 325 MG tablet Take 2 tablets by mouth every 6 hours as needed for Pain 120 tablet 3    FLUoxetine (PROZAC) 40 MG capsule Take 1 capsule by mouth daily 30 capsule 5    cholestyramine (QUESTRAN) 4 g packet TAKE 1 PACKET BY MOUTH 2 TIMES DAILY      omeprazole (PRILOSEC) 20 MG delayed release capsule Take 20 mg by mouth daily        No Known Allergies  No current facility-administered medications for this encounter. Current Outpatient Medications   Medication Sig Dispense Refill    naproxen (NAPROSYN) 500 MG tablet Take 1 tablet by mouth 2 times daily 60 tablet 0    acetaminophen (TYLENOL) 325 MG tablet Take 2 tablets by mouth every 6 hours as needed for Pain 120 tablet 3    FLUoxetine (PROZAC) 40 MG capsule Take 1 capsule by mouth daily 30 capsule 5    cholestyramine (QUESTRAN) 4 g packet TAKE 1 PACKET BY MOUTH 2 TIMES DAILY      omeprazole (PRILOSEC) 20 MG delayed release capsule Take 20 mg by mouth daily         Nursing Notes Reviewed    VITAL SIGNS:  ED Triage Vitals [12/17/22 1253]   Enc Vitals Group      BP (!) 154/89      Heart Rate 69      Resp 16      Temp 98.5 °F (36.9 °C)      Temp Source Oral      SpO2 96 %      Weight 250 lb (113.4 kg)      Height 5' 11\" (1.803 m)      Head Circumference       Peak Flow       Pain Score       Pain Loc       Pain Edu? Excl. in [address]? PHYSICAL EXAM:  Physical Exam  Vitals and nursing note reviewed. Constitutional:       General: He is not in acute distress. Appearance: Normal appearance. He is not ill-appearing or toxic-appearing. HENT:      Head: Normocephalic and atraumatic. Eyes:      General: No scleral icterus. Right eye: No discharge. Left eye: No discharge. Extraocular Movements: Extraocular movements intact. Conjunctiva/sclera: Conjunctivae normal.   Cardiovascular:      Rate and Rhythm: Normal rate and regular rhythm. Pulses: Normal pulses. Heart sounds: Normal heart sounds. Pulmonary:      Effort: Pulmonary effort is normal. No respiratory distress. Breath sounds: Normal breath sounds. No stridor. No wheezing, rhonchi or rales. Musculoskeletal:         General: Tenderness present.  No swelling, deformity or signs of injury. Cervical back: Normal range of motion. No rigidity. Right knee: No swelling, deformity, effusion, erythema, ecchymosis, lacerations or crepitus. Decreased range of motion. Tenderness present. No patellar tendon tenderness. No LCL laxity or MCL laxity. Normal alignment. Normal pulse. Instability Tests: Anterior drawer test negative. Posterior drawer test negative. Lateral Tequila test positive. Right lower leg: Normal. No swelling, deformity, lacerations or tenderness. No edema. Left lower leg: No edema. Right ankle: Normal.      Right Achilles Tendon: Normal.        Legs:    Skin:     General: Skin is warm. Coloration: Skin is not jaundiced or pale. Findings: No bruising, erythema, lesion or rash. Neurological:      General: No focal deficit present. Mental Status: He is alert and oriented to person, place, and time. Cranial Nerves: No cranial nerve deficit. Sensory: No sensory deficit. Motor: No weakness. Coordination: Coordination normal.   Psychiatric:         Mood and Affect: Mood normal.         Behavior: Behavior normal.         I have reviewed andinterpreted all of the currently available lab results from this visit (if applicable):    No results found for this visit on 12/17/22. Radiographs (if obtained):  [] The following radiograph was interpreted by myself in the absence of a radiologist:  [x] Radiologist's Report Reviewed:      EKG (if obtained): (All EKG's are interpreted by myself in the absence of a cardiologist)    MDM:    Patient with complaint of right knee pain. Again patient states on Tuesday again he was sitting crosslegged when he stood up and heard a pop in his right knee. Ever since and having pain with some positions better with others.   He was seen at [facility] yesterday and they did an x-ray of his right knee which showed no acute osseous abnormality I did review the result of his phone could not find images. I did try to do a chart review could not find images either but the report and said no acute osseous abnormality. Patient is requesting an MRI of his right knee to make sure he does not have ligament or tendon damage. I did explain I cannot do this here in the ER today. He has seen orthopedic surgery before, Dr. Sandra Mendez. He denies any fevers nausea vomiting chest pain shortness of breath weakness numbness or tingling no injury or trauma that is new since Tuesday. He is able to ambulate on it but it hurts sometimes. He is requesting a work note as well as well as a knee brace. He appears otherwise well he has no swelling he has good pulses good sensation compartments are soft with no pockets of DVT arterial ischemia I do not see any erythema or signs of infection. I did offer new x-ray but he does not want respectfully. Patient does have tenderness along his lateral joint line of his right knee he likely has a sprain, or strain or meniscal tear there is no definite joint laxity. Will be discharged home with medication given knee brace given return precautions and follow-up information. Okay with Plan. Stable. I do not think he needs additional labs or imaging.     CLINICAL IMPRESSION:  Final diagnoses:   Right knee pain, unspecified chronicity       (Please note that portions of this note may have been completed with a voice recognition program. Efforts were made to edit the dictations but occasionally words aremis-transcribed.)    DISPOSITION REFERRAL (if applicable):  Jomarie Severe, MD  [address]   [phone]    Schedule an appointment as soon as possible for a visit in 1 day      Mag Leon MD  [facility] [address] [place] [address]  [phone]    Schedule an appointment as soon as possible for a visit in 1 day  Orthopedic [address] [place] [address] (if applicable):  New Prescriptions    ACETAMINOPHEN (TYLENOL) 325 MG TABLET    Take 2 tablets by mouth every 6 hours as needed for Pain    NAPROXEN (NAPROSYN) 500 MG TABLET    Take 1 tablet by mouth 2 times daily          Flip Ingram, DO Flip Ingram DO  12/17/22 6227

## 2022-12-21 ENCOUNTER — OFFICE VISIT (OUTPATIENT)
Dept: ORTHOPEDIC SURGERY | Age: 22
End: 2022-12-21
Payer: COMMERCIAL

## 2022-12-21 VITALS — OXYGEN SATURATION: 96 % | HEART RATE: 58 BPM | HEIGHT: 71 IN | WEIGHT: 250 LBS | BODY MASS INDEX: 35 KG/M2

## 2022-12-21 DIAGNOSIS — S05.40XA RETROBULBAR FOREIGN BODY, UNSPECIFIED LATERALITY: Primary | ICD-10-CM

## 2022-12-21 DIAGNOSIS — S83.282A ACUTE LATERAL MENISCUS TEAR OF LEFT KNEE, INITIAL ENCOUNTER: ICD-10-CM

## 2022-12-21 PROCEDURE — G8484 FLU IMMUNIZE NO ADMIN: HCPCS | Performed by: STUDENT IN AN ORGANIZED HEALTH CARE EDUCATION/TRAINING PROGRAM

## 2022-12-21 PROCEDURE — G8427 DOCREV CUR MEDS BY ELIG CLIN: HCPCS | Performed by: STUDENT IN AN ORGANIZED HEALTH CARE EDUCATION/TRAINING PROGRAM

## 2022-12-21 PROCEDURE — 4004F PT TOBACCO SCREEN RCVD TLK: CPT | Performed by: STUDENT IN AN ORGANIZED HEALTH CARE EDUCATION/TRAINING PROGRAM

## 2022-12-21 PROCEDURE — 99203 OFFICE O/P NEW LOW 30 MIN: CPT | Performed by: STUDENT IN AN ORGANIZED HEALTH CARE EDUCATION/TRAINING PROGRAM

## 2022-12-21 PROCEDURE — G8417 CALC BMI ABV UP PARAM F/U: HCPCS | Performed by: STUDENT IN AN ORGANIZED HEALTH CARE EDUCATION/TRAINING PROGRAM

## 2022-12-21 ASSESSMENT — ENCOUNTER SYMPTOMS
COUGH: 0
COLOR CHANGE: 0
NAUSEA: 0
VOMITING: 0
VOICE CHANGE: 0
BACK PAIN: 0
WHEEZING: 0
SHORTNESS OF BREATH: 0
SORE THROAT: 0

## 2022-12-21 NOTE — PROGRESS NOTES
Patient is a 25y.o. year old male. Patient is in the office today with right knee pain. Patient states that he injured themselves while getting up off the floor. He states that he felt and heard a pop and has immediate pain. Patient states that the injury happened 12/13/22. He was seen in the [facility] ED on 12/16/22, X-rays were taken at that time. He was then seen at [facility] and was given a brace and Tylenol. Pain scale  2/10 currently but pain increase to 6-7/10 with twisting movement. He describes the pain as achy while sitting and shooting with twisting motion. His pain is located on the lateral portion of his knee. Occupation: , working with [employer].

## 2022-12-21 NOTE — LETTER
[facility]  [address]  Phone: [phone]  Fax: [phone]    Lana Leon DO        December 21, 2022     Patient: Everette Kim   YOB: 2000   Date of Visit: 12/21/2022       To Whom It May Concern: It is my medical opinion that Bria Coelho may return to work on 12/22/22 with the following restrictions: no lifting more than 50lbs, no stairs or climbing, must be able to wear brace and take breaks as needed. .    If you have any questions or concerns, please don't hesitate to call.     Sincerely,        Lana Leon, DO

## 2022-12-21 NOTE — PROGRESS NOTES
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 Patient states his knee pain began approximately 1 week ago. He was getting up from the floor in a crosslegged position and getting up he felt a painful pop in the posterior lateral aspect of his right knee. He was taken to the emergency room several days later due to continued pain and was seen in the emergency room at an outside facility. X-ray at that time were negative and he was placed in a knee brace and told to follow-up with orthopedics. He is here today for his first visit with myself. He has no advanced imaging thus far. The pain's location is posterior lateral joint line of right knee. he describes the symptoms as aching, sharp and stabbing. Symptoms improve with rest. Symptoms worsen with deep knee bending, getting up from a chair, weight bearing, sitting for prolonged periods of time, twisting activities. Patient denies additional prior injury to knee, denies numbness, tingling, fever, chills. Denies swelling or effusions. Admits to occasional prominent mechanical symptoms. Denies instability. Worse with creased activity. Better with rest,     Treatment thus far has included rest, activity modifications, bracing, oral medications without significant relief. Here today to discuss diagnosis and treatment options. Is affecting ADLs. Pain is 7/10 at it's worst.    No advanced imaging thus far    Outside reports reviewed: Emergency room imaging reviewed. Patient's medications, allergies, past medical, surgical, social and family histories were reviewed and updated as appropriate. Patient is getting mild pain relief from ibuprofen.       Medical History  Patient's medications, allergies, past medical, surgical, social and family histories were reviewed and updated as appropriate.     Past Medical History:   Diagnosis Date    Asthma     Follows with Dr. Kristin Seals    Hx of blood clots 10/22/2019    PE (after ankle fx)     Past Surgical History:   Procedure Laterality Date    CARPAL TUNNEL RELEASE Left 07/2020    CHOLECYSTECTOMY, LAPAROSCOPIC N/A 10/20/2020    CHOLECYSTECTOMY LAPAROSCOPIC performed by Roel Leigh MD at [address]  2011    UPPER GASTROINTESTINAL ENDOSCOPY N/A 9/1/2021    EGD BIOPSY performed by Roel Leigh MD at [facility] ENDOSCOPY     Family History   Problem Relation Age of Onset    High Cholesterol Mother     Cancer Paternal Grandfather      Social History     Socioeconomic History    Marital status: Single   Tobacco Use    Smoking status: Every Day     Packs/day: 0.50     Years: 1.00     Pack years: 0.50     Types: Cigarettes     Start date: 2019    Smokeless tobacco: Never   Vaping Use    Vaping Use: Every day    Substances: Nicotine, Flavoring    Devices: Pre-filled or refillable cartridge, Pre-filled pod   Substance and Sexual Activity    Alcohol use: No    Drug use: Not Currently     Types: Marijuana Vello Vinson)     Comment: Last used 8/2020    Sexual activity: Never   Social History Narrative    ** Merged History Encounter **          Social Determinants of Health     Financial Resource Strain: Unknown    Difficulty of Paying Living Expenses: Patient refused   Food Insecurity: Unknown    Worried About Running Out of Food in the Last Year: Patient refused    Ran Out of Food in the Last Year: Patient refused     Current Outpatient Medications   Medication Sig Dispense Refill    acetaminophen (TYLENOL) 325 MG tablet Take 2 tablets by mouth every 6 hours as needed for Pain 120 tablet 3    FLUoxetine (PROZAC) 40 MG capsule Take 1 capsule by mouth daily 30 capsule 5    cholestyramine (QUESTRAN) 4 g packet TAKE 1 PACKET BY MOUTH 2 TIMES DAILY      omeprazole (PRILOSEC) 20 MG delayed release capsule Take 20 mg by mouth daily      naproxen (NAPROSYN) 500 MG tablet Take 1 tablet by mouth 2 times daily (Patient not taking: Reported on 12/21/2022) 60 tablet 0     No current facility-administered medications for this visit. No Known Allergies      Review of Systems   Constitutional:  Positive for activity change. Negative for fatigue and fever. HENT:  Negative for sneezing, sore throat and voice change. Respiratory:  Negative for cough, shortness of breath and wheezing. Cardiovascular:  Negative for leg swelling. Gastrointestinal:  Negative for nausea and vomiting. Musculoskeletal:  Positive for arthralgias, joint swelling and myalgias. Negative for back pain, gait problem, neck pain and neck stiffness. Skin:  Negative for color change, rash and wound. Neurological:  Negative for weakness and numbness. Psychiatric/Behavioral:  Negative for behavioral problems, confusion and self-injury. Examination:  General Exam:  Vitals: Pulse 58   Ht 5' 11\" (1.803 m)   Wt 250 lb (113.4 kg)   SpO2 96%   BMI 34.87 kg/m²    Physical Exam  Constitutional:       General: He is not in acute distress. Appearance: Normal appearance. He is obese. HENT:      Head: Normocephalic and atraumatic. Eyes:      General:         Right eye: No discharge. Left eye: No discharge. Extraocular Movements: Extraocular movements intact. Cardiovascular:      Pulses: Normal pulses. Pulmonary:      Effort: Pulmonary effort is normal.      Breath sounds: Normal breath sounds. Musculoskeletal:         General: Tenderness and signs of injury present. No swelling or deformity. Cervical back: Normal range of motion. Right hip: Normal.      Left hip: Normal.      Right upper leg: Normal.      Left upper leg: Normal.      Right knee: Swelling, bony tenderness and crepitus present.  No deformity, effusion, erythema, ecchymosis or lacerations. Normal range of motion. Tenderness present over the lateral joint line. No medial joint line, MCL, LCL, ACL, PCL or patellar tendon tenderness. No LCL laxity, MCL laxity, ACL laxity or PCL laxity. Abnormal meniscus. Normal alignment and normal patellar mobility. Normal pulse. Instability Tests: Anterior drawer test negative. Posterior drawer test negative. Medial Tequila test negative and lateral Tequila test negative. Left knee: Normal.      Right lower leg: Normal. No edema. Left lower leg: Normal. No edema. Right ankle: Normal.      Left ankle: Normal.      Right foot: Normal.      Left foot: Normal.   Skin:     General: Skin is warm and dry. Capillary Refill: Capillary refill takes less than 2 seconds. Neurological:      General: No focal deficit present. Mental Status: He is alert and oriented to person, place, and time. Mental status is at baseline. Gait: Gait normal.   Psychiatric:         Mood and Affect: Mood normal.         Behavior: Behavior normal.      RIGHT KNEE EXAMINATION       OBSERVATION / INSPECTION     Gait: Normal antalgic    Alignment: Neutral     Scars: None     Muscle atrophy: None    Effusion: None     Warmth: None     Discoloration: none       TENDERNESS / CREPITUS (T / C):       Patella - / -     Lateral joint line +/ +  Medial joint line  - / -     Peripatellar lateral - / -  Peripatellar medial  - / -     Medial plica - / -  Lateral plica - / -    Patellar tendon - / -   Prepatellar Bursa - / -     Popliteal fossa - / -    Gastrocnemius - / -    Quadricep - / -   Quad tendon - / -      Tibial tubercle - / -     Thigh/hamstring - / -  Pes anserine/HS - / -     ITB - / -     Tibia - / -   Fibula - / -    Tib/fib joint - / -     MFC - / -    LFC - / -     MCL: Proximal - / -  Distal - / -    LCL - / -    MCL - / -      ROM: (* = pain)  PASSIVE  ACTIVE     Left :    0 / 145  0 / 145      Right :    0 / 145  0 / 145      PATELLOFEMORAL EXAMINATION:    See above noted areas of tenderness. Patella position     Subluxation / dislocation: Centered     Sup. / Inf; Normal     Crepitus (PF): Absent     Patellar Mobility:     Medial-lateral: Normal     Superior-inferior: Normal     Inferior tilt Normal     Patellar tendon: Normal     Lateral tilt: Normal     J-sign: None     Patellofemoral grind: No pain         MENISCAL SIGNS:     Pain on terminal extension: -    Pain on terminal flexion: +    Tequilas maneuver: + Positive for pain and mechanical symptoms laterally    Squat: Positive for pain mechanical symptoms laterally      LIGAMENT EXAMINATION:    ACL / Lachman: normal (-1 to 2mm)      PCL-Post.  drawer: normal 0 to 2mm    MCL- Valgus: normal 0 to 2mm    LCL- Varus:  normal 0 to 2mm    Pivot shift: normal (Equal)     Dial Test: No difference c/w other side    At 30° flexion: normal (< 5°)     At 90° flexion: normal (< 5°)     Reverse Pivot Shift:   normal (Equal)       STRENGTH: (* = with pain) PAINFUL SIDE    Quadricep 5/5    Hamstrin/5      EXTREMITY NEURO-VASCULAR EXAMINATION:     Sensation:  Grossly intact to light touch all dermatomal regions. Motor Function:  Fully intact motor function at hip, knee, foot and ankle      DTRs;  quadriceps and  achilles 2+. No clonus and downgoing Babinski. Vascular status:  DP and PT pulses 2+, brisk capillary refill, symmetric. Diagnostic testing:  X-ray images were reviewed by myself and discussed with the patient (review on care everywhere):  2 views of a right knee in a skeletally mature patient demonstrates no acute osseous normalities. Alignment is appropriate. No sign of any soft tissue avulsion type injury or soft tissue calcification. No sign of any fracture or malalignment.     Office Procedures:  Orders Placed This Encounter   Procedures    XR ORBITS (MIN 4 VIEWS)     Standing Status:   Future     Standing Expiration Date:   2023       Assessment and Plan    A: Right knee internal derangement    P:   I had a thorough conversation with the patient regarding his right knee pain and treatment course. I explained the imaging from the other emergency room does not demonstrate any acute osseous abnormalities but his issues are likely soft tissue in nature and in order to properly assess this, we should obtain an MRI of the right knee. I explained that because of his young age and mechanical symptoms I do feel that obtaining an MRI at this point is appropriate. He will continue the current knee brace as well as avoiding any type of deep knee body weight squatting or any type of exercise. He can do gentle range of motion. He was given work restrictions and note today. He will follow-up after the MRI is completed. He is a  and therefore x-rays of his orbits were ordered to ensure that there is no metallic foreign bodies in his eyes. He will continue ice and over-the-counter pain medication for pain control.   All questions were answered and patient voices understanding    Electronically signed by Amy Sotomayor DO on 12/21/2022 at 3:20 PM

## 2022-12-21 NOTE — PATIENT INSTRUCTIONS
Right knee MRI scheduled. Call Central scheduling [phone] to schedule MRI. Follow up for MRI results. Work note given with restrictions of no lifting more than 50lbs, no stairs or climbing, must be able to wear brace and take breaks as needed.

## 2023-01-03 ENCOUNTER — HOSPITAL ENCOUNTER (OUTPATIENT)
Age: 23
Discharge: HOME | End: 2023-01-03
Payer: COMMERCIAL

## 2023-01-03 ENCOUNTER — HOSPITAL ENCOUNTER (OUTPATIENT)
Dept: GENERAL RADIOLOGY | Age: 23
Discharge: HOME | End: 2023-01-03
Payer: COMMERCIAL

## 2023-01-03 DIAGNOSIS — S05.40XA RETROBULBAR FOREIGN BODY, UNSPECIFIED LATERALITY: ICD-10-CM

## 2023-01-03 PROCEDURE — 70200 X-RAY EXAM OF EYE SOCKETS: CPT

## 2023-01-06 ENCOUNTER — HOSPITAL ENCOUNTER (OUTPATIENT)
Dept: MRI IMAGING | Age: 23
Discharge: HOME | End: 2023-01-06
Payer: COMMERCIAL

## 2023-01-06 DIAGNOSIS — S83.282A ACUTE LATERAL MENISCUS TEAR OF LEFT KNEE, INITIAL ENCOUNTER: ICD-10-CM

## 2023-01-06 PROCEDURE — 73721 MRI JNT OF LWR EXTRE W/O DYE: CPT

## 2023-01-11 ENCOUNTER — OFFICE VISIT (OUTPATIENT)
Dept: ORTHOPEDIC SURGERY | Age: 23
End: 2023-01-11

## 2023-01-11 VITALS
DIASTOLIC BLOOD PRESSURE: 81 MMHG | HEART RATE: 71 BPM | BODY MASS INDEX: 35 KG/M2 | OXYGEN SATURATION: 97 % | WEIGHT: 250 LBS | HEIGHT: 71 IN | SYSTOLIC BLOOD PRESSURE: 122 MMHG

## 2023-01-11 DIAGNOSIS — M22.41 CHONDROMALACIA OF RIGHT PATELLOFEMORAL JOINT: Primary | ICD-10-CM

## 2023-01-11 ASSESSMENT — ENCOUNTER SYMPTOMS
COLOR CHANGE: 0
SHORTNESS OF BREATH: 0
COUGH: 0
VOICE CHANGE: 0
WHEEZING: 0
BACK PAIN: 0
NAUSEA: 0
SORE THROAT: 0
VOMITING: 0

## 2023-01-11 NOTE — PROGRESS NOTES
1/11/2023   Chief Complaint   Patient presents with    Follow-up     Right knee       Updated HPI: Patient is here for reevaluation of his right knee as well as MRI review. Patient is overall he does feel that his pain is improving. He states he has no pain today. He states he does have some pain with deep knee bending. He has been essentially without pain for the last 2 weeks. No new injuries or complaints. No changes in his health history. 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 Patient states his knee pain began approximately 1 week ago. He was getting up from the floor in a crosslegged position and getting up he felt a painful pop in the posterior lateral aspect of his right knee. He was taken to the emergency room several days later due to continued pain and was seen in the emergency room at an outside facility. X-ray at that time were negative and he was placed in a knee brace and told to follow-up with orthopedics. He is here today for his first visit with myself. He has no advanced imaging thus far. The pain's location is posterior lateral joint line of right knee. he describes the symptoms as aching, sharp and stabbing. Symptoms improve with rest. Symptoms worsen with deep knee bending, getting up from a chair, weight bearing, sitting for prolonged periods of time, twisting activities. Patient denies additional prior injury to knee, denies numbness, tingling, fever, chills. Denies swelling or effusions. Admits to occasional prominent mechanical symptoms. Denies instability. Worse with creased activity. Better with rest,     Treatment thus far has included rest, activity modifications, bracing, oral medications without significant relief. Here today to discuss diagnosis and treatment options. Is affecting ADLs. Pain is 7/10 at it's worst.    No advanced imaging thus far    Outside reports reviewed: Emergency room imaging reviewed. Patient's medications, allergies, past medical, surgical, social and family histories were reviewed and updated as appropriate. Patient is getting mild pain relief from ibuprofen. Medical History  Patient's medications, allergies, past medical, surgical, social and family histories were reviewed and updated as appropriate.     Past Medical History:   Diagnosis Date    Asthma     Follows with Dr. Kirsten Alexis    Hx of blood clots 10/22/2019    PE (after ankle fx)     Past Surgical History:   Procedure Laterality Date    CARPAL TUNNEL RELEASE Left 07/2020    CHOLECYSTECTOMY, LAPAROSCOPIC N/A 10/20/2020    CHOLECYSTECTOMY LAPAROSCOPIC performed by Jeanmarie Cooper MD at [facility]  2011    UPPER GASTROINTESTINAL ENDOSCOPY N/A 9/1/2021    EGD BIOPSY performed by Jeanmarie Cooper MD at [facility] ENDOSCOPY     Family History   Problem Relation Age of Onset    High Cholesterol Mother     Cancer Paternal Grandfather      Social History     Socioeconomic History    Marital status: Single   Tobacco Use    Smoking status: Every Day     Packs/day: 0.50     Years: 1.00     Pack years: 0.50     Types: Cigarettes     Start date: 2019    Smokeless tobacco: Never   Vaping Use    Vaping Use: Every day    Substances: Nicotine, Flavoring    Devices: Pre-filled or refillable cartridge, Pre-filled pod   Substance and Sexual Activity    Alcohol use: No    Drug use: Not Currently     Types: Marijuana Seabron Barban)     Comment: Last used 8/2020    Sexual activity: Never   Social History Narrative    ** Merged History Encounter **          Social Determinants of Health     Financial Resource Strain: Unknown    Difficulty of Paying Living Expenses: Patient refused   Food Insecurity: Unknown    Worried About Running Out of Food in the Last Year: Patient refused    Ran Out of Food in the Last Year: Patient refused     Current Outpatient Medications   Medication Sig Dispense Refill    acetaminophen (TYLENOL) 325 MG tablet Take 2 tablets by mouth every 6 hours as needed for Pain 120 tablet 3    FLUoxetine (PROZAC) 40 MG capsule Take 1 capsule by mouth daily 30 capsule 5    cholestyramine (QUESTRAN) 4 g packet TAKE 1 PACKET BY MOUTH 2 TIMES DAILY      omeprazole (PRILOSEC) 20 MG delayed release capsule Take 20 mg by mouth daily      naproxen (NAPROSYN) 500 MG tablet Take 1 tablet by mouth 2 times daily (Patient not taking: No sig reported) 60 tablet 0     No current facility-administered medications for this visit. No Known Allergies      Review of Systems   Constitutional:  Positive for activity change. Negative for fatigue and fever. HENT:  Negative for sneezing, sore throat and voice change. Respiratory:  Negative for cough, shortness of breath and wheezing. Cardiovascular:  Negative for leg swelling. Gastrointestinal:  Negative for nausea and vomiting. Musculoskeletal:  Positive for arthralgias, joint swelling and myalgias. Negative for back pain, gait problem, neck pain and neck stiffness. Skin:  Negative for color change, rash and wound. Neurological:  Negative for weakness and numbness. Psychiatric/Behavioral:  Negative for behavioral problems, confusion and self-injury. Examination:  General Exam:  Vitals: /81   Pulse 71   Ht 5' 11\" (1.803 m)   Wt 250 lb (113.4 kg)   SpO2 97%   BMI 34.87 kg/m²    Physical Exam  Constitutional:       General: He is not in acute distress. Appearance: Normal appearance. He is obese. HENT:      Head: Normocephalic and atraumatic. Eyes:      General:         Right eye: No discharge. Left eye: No discharge. Extraocular Movements: Extraocular movements intact. Cardiovascular:      Pulses: Normal pulses.    Pulmonary:      Effort: Pulmonary effort is normal.      Breath sounds: Normal breath sounds. Musculoskeletal:         General: Tenderness and signs of injury present. No swelling or deformity. Cervical back: Normal range of motion. Right hip: Normal.      Left hip: Normal.      Right upper leg: Normal.      Left upper leg: Normal.      Right knee: Swelling, bony tenderness and crepitus present. No deformity, effusion, erythema, ecchymosis or lacerations. Normal range of motion. Tenderness present over the lateral joint line. No medial joint line, MCL, LCL, ACL, PCL or patellar tendon tenderness. No LCL laxity, MCL laxity, ACL laxity or PCL laxity. Abnormal meniscus. Normal alignment and normal patellar mobility. Normal pulse. Instability Tests: Anterior drawer test negative. Posterior drawer test negative. Medial Tequila test negative and lateral Tequila test negative. Left knee: Normal.      Right lower leg: Normal. No edema. Left lower leg: Normal. No edema. Right ankle: Normal.      Left ankle: Normal.      Right foot: Normal.      Left foot: Normal.   Skin:     General: Skin is warm and dry. Capillary Refill: Capillary refill takes less than 2 seconds. Neurological:      General: No focal deficit present. Mental Status: He is alert and oriented to person, place, and time. Mental status is at baseline. Gait: Gait normal.   Psychiatric:         Mood and Affect: Mood normal.         Behavior: Behavior normal.      RIGHT KNEE EXAMINATION       OBSERVATION / INSPECTION     Gait: Normal antalgic    Alignment: Neutral     Scars: None     Muscle atrophy: None    Effusion: None     Warmth: None     Discoloration: none       TENDERNESS / CREPITUS (T / C):       Patella - / -     Lateral joint line + / +  Medial joint line  - / -     Peripatellar lateral + / -      Peripatellar medial  - / -     Medial plica - / -  Lateral plica - / -    Patellar tendon - / -   Prepatellar Bursa - / -     Popliteal fossa - / -    Gustavo Rides - / -    Derril Tiki - / -   Quad tendon - / -      Tibial tubercle - / -     Thigh/hamstring - / -  Pes anserine/HS - / -     ITB - / -     Tibia - / -   Fibula - / -    Tib/fib joint - / -     MFC - / -    LFC - / -     MCL: Proximal - / -  Distal - / -    LCL - / -    MCL - / -      ROM: (* = pain)  PASSIVE  ACTIVE     Left :    0 / 145  0 / 145      Right :    0 / 145  0 / 145      PATELLOFEMORAL EXAMINATION:    See above noted areas of tenderness. Patella position     Subluxation / dislocation: Centered     Sup. / Inf; Normal     Crepitus (PF): Absent     Patellar Mobility:     Medial-lateral: Normal     Superior-inferior: Normal     Inferior tilt Normal     Patellar tendon: Normal     Lateral tilt: Minimally positive    J-sign: None     Patellofemoral grind: Minimal pain         MENISCAL SIGNS:     Pain on terminal extension: -    Pain on terminal flexion: +    Tequilas maneuver: Negative    squat: Positive for pain at patellofemoral joint only      LIGAMENT EXAMINATION:    ACL / Lachman: normal (-1 to 2mm)      PCL-Post.  drawer: normal 0 to 2mm    MCL- Valgus: normal 0 to 2mm    LCL- Varus:  normal 0 to 2mm      STRENGTH: (* = with pain) PAINFUL SIDE    Quadricep 5/5    Hamstrin/5      EXTREMITY NEURO-VASCULAR EXAMINATION:     Sensation:  Grossly intact to light touch all dermatomal regions. Motor Function:  Fully intact motor function at hip, knee, foot and ankle      DTRs;  quadriceps and  achilles 2+. No clonus and downgoing Babinski. Vascular status:  DP and PT pulses 2+, brisk capillary refill, symmetric. Diagnostic testing:    MRI right knee without contrast images were reviewed by myself and discussed with the patient:  MENISCI: No meniscal tear identified. CRUCIATE LIGAMENTS: ACL and PCL are intact. EXTENSOR MECHANISM: Patellar and distal quadriceps tendons are intact. Medial and lateral patellar retinacula appear intact. There is mild lateral   patellar subluxation.   Focal edema is seen at the superolateral aspect of   Hoffa's fat pad. TT-TG distance is 13 mm. High-riding patella. LATERAL COLLATERAL LIGAMENT COMPLEX: Iliotibial band, fibular collateral   ligament, and biceps femoris tendon are intact. MEDIAL COLLATERAL LIGAMENT COMPLEX: The superficial and deep components of   the medial collateral ligament are intact. KNEE JOINT: No knee joint effusion. Tiny Baker's cyst.  No full-thickness   cartilage defect is identified. BONE MARROW: No acute fracture or significant bone marrow edema. Previous imagin views of a right knee in a skeletally mature patient demonstrates no acute osseous normalities. Alignment is appropriate. No sign of any soft tissue avulsion type injury or soft tissue calcification. No sign of any fracture or malalignment. Office Procedures:  No orders of the defined types were placed in this encounter. Assessment and Plan    A: Right knee patellofemoral chondromalacia    P:   I had a thorough conversation with the patient regarding his MRI findings and how they correlate to his current physical exam findings. I explained that on MRI there is no increased TT-TG distance which is a issue that would sometimes make him a surgical candidate. I did however explained that he does have a patella that rides laterally which is not dispersed the forces evenly in the patellofemoral joint which is the cause of his pain. I explained that this will be a long-term issue but we can make it better with several conservative measures which I discussed with him today. We will get him a J brace today to help push the patella more medially. We will also give him a home exercise program to work on patellofemoral issues and VMO strengthening. He will follow-up in 3 months for reevaluation and we can discuss formal therapy at that time if needed. We will hold off any type of cortisone injection at this time due to his young age.   No need for surgical discussion at this time due to his normal TT-TG distance as well as no issues with patellar instability or patellar lesions on MRI. Patient can continue all activities as tolerated but should avoid any heavy deep knee bending or any open chain knee extension exercises. Patient will continue with ice and over-the-counter pain medications for pain control. All questions were answered and patient voices understanding.     Electronically signed by Riddhi Garza DO on 1/11/2023 at 4:05 PM

## 2023-01-11 NOTE — PROGRESS NOTES
Patient comes in today for an MRI follow up for Right knee pain. He was last seen in our office on 12/21/22. He rates his pain at 0/10 today. He states that he only has pain with deep knee bending. He states that he has been without pain for about 2 weeks. He denies any new falls or injuries. MRI completed on 1/6/23  No meniscal tear is identified. Cruciate and collateral ligaments are intact. High-riding patella. Mild lateral patellar subluxation. Focal edema is seen   at the superolateral aspect of Hoffa's fat pad. Correlate for lateral   femoral condyle-patellar tendon friction syndrome.

## 2023-01-12 PROBLEM — M22.41 CHONDROMALACIA OF RIGHT PATELLOFEMORAL JOINT: Status: ACTIVE | Noted: 2023-01-12

## 2023-02-02 ENCOUNTER — OFFICE VISIT (OUTPATIENT)
Dept: FAMILY MEDICINE CLINIC | Age: 23
End: 2023-02-02
Payer: COMMERCIAL

## 2023-02-02 VITALS
HEIGHT: 70 IN | HEART RATE: 73 BPM | OXYGEN SATURATION: 96 % | WEIGHT: 249.8 LBS | DIASTOLIC BLOOD PRESSURE: 84 MMHG | SYSTOLIC BLOOD PRESSURE: 138 MMHG | BODY MASS INDEX: 35.76 KG/M2

## 2023-02-02 DIAGNOSIS — F32.A ANXIETY AND DEPRESSION: Primary | ICD-10-CM

## 2023-02-02 DIAGNOSIS — R19.7 DIARRHEA, UNSPECIFIED TYPE: ICD-10-CM

## 2023-02-02 DIAGNOSIS — F41.9 ANXIETY AND DEPRESSION: Primary | ICD-10-CM

## 2023-02-02 PROCEDURE — G8417 CALC BMI ABV UP PARAM F/U: HCPCS | Performed by: FAMILY MEDICINE

## 2023-02-02 PROCEDURE — 99213 OFFICE O/P EST LOW 20 MIN: CPT | Performed by: FAMILY MEDICINE

## 2023-02-02 PROCEDURE — G8427 DOCREV CUR MEDS BY ELIG CLIN: HCPCS | Performed by: FAMILY MEDICINE

## 2023-02-02 PROCEDURE — 4004F PT TOBACCO SCREEN RCVD TLK: CPT | Performed by: FAMILY MEDICINE

## 2023-02-02 PROCEDURE — G8484 FLU IMMUNIZE NO ADMIN: HCPCS | Performed by: FAMILY MEDICINE

## 2023-02-02 RX ORDER — LOPERAMIDE HYDROCHLORIDE 2 MG/1
2 TABLET ORAL 2 TIMES DAILY PRN
Qty: 60 TABLET | Refills: 5 | Status: SHIPPED | OUTPATIENT
Start: 2023-02-02 | End: 2023-03-04

## 2023-02-02 SDOH — ECONOMIC STABILITY: FOOD INSECURITY: WITHIN THE PAST 12 MONTHS, YOU WORRIED THAT YOUR FOOD WOULD RUN OUT BEFORE YOU GOT MONEY TO BUY MORE.: NEVER TRUE

## 2023-02-02 SDOH — ECONOMIC STABILITY: FOOD INSECURITY: WITHIN THE PAST 12 MONTHS, THE FOOD YOU BOUGHT JUST DIDN'T LAST AND YOU DIDN'T HAVE MONEY TO GET MORE.: NEVER TRUE

## 2023-02-02 SDOH — ECONOMIC STABILITY: HOUSING INSECURITY
IN THE LAST 12 MONTHS, WAS THERE A TIME WHEN YOU DID NOT HAVE A STEADY PLACE TO SLEEP OR SLEPT IN A SHELTER (INCLUDING NOW)?: NO

## 2023-02-02 SDOH — ECONOMIC STABILITY: INCOME INSECURITY: HOW HARD IS IT FOR YOU TO PAY FOR THE VERY BASICS LIKE FOOD, HOUSING, MEDICAL CARE, AND HEATING?: SOMEWHAT HARD

## 2023-02-02 ASSESSMENT — PATIENT HEALTH QUESTIONNAIRE - PHQ9
SUM OF ALL RESPONSES TO PHQ QUESTIONS 1-9: 9
2. FEELING DOWN, DEPRESSED OR HOPELESS: 2
SUM OF ALL RESPONSES TO PHQ QUESTIONS 1-9: 9
3. TROUBLE FALLING OR STAYING ASLEEP: 0
8. MOVING OR SPEAKING SO SLOWLY THAT OTHER PEOPLE COULD HAVE NOTICED. OR THE OPPOSITE, BEING SO FIGETY OR RESTLESS THAT YOU HAVE BEEN MOVING AROUND A LOT MORE THAN USUAL: 0
1. LITTLE INTEREST OR PLEASURE IN DOING THINGS: 2
7. TROUBLE CONCENTRATING ON THINGS, SUCH AS READING THE NEWSPAPER OR WATCHING TELEVISION: 0
4. FEELING TIRED OR HAVING LITTLE ENERGY: 2
5. POOR APPETITE OR OVEREATING: 3
9. THOUGHTS THAT YOU WOULD BE BETTER OFF DEAD, OR OF HURTING YOURSELF: 0
10. IF YOU CHECKED OFF ANY PROBLEMS, HOW DIFFICULT HAVE THESE PROBLEMS MADE IT FOR YOU TO DO YOUR WORK, TAKE CARE OF THINGS AT HOME, OR GET ALONG WITH OTHER PEOPLE: 1
SUM OF ALL RESPONSES TO PHQ QUESTIONS 1-9: 9
6. FEELING BAD ABOUT YOURSELF - OR THAT YOU ARE A FAILURE OR HAVE LET YOURSELF OR YOUR FAMILY DOWN: 0
SUM OF ALL RESPONSES TO PHQ QUESTIONS 1-9: 9
SUM OF ALL RESPONSES TO PHQ9 QUESTIONS 1 & 2: 4

## 2023-02-02 NOTE — PROGRESS NOTES
Kymberly Gum  2000 02/03/23    Chief Complaint   Patient presents with    3 Month Follow-Up    Anxiety    Depression    Medication Refill           Patient here for 3 months  follow-up visit regarding his anxiety and depression, did increase his the Prozac last time to 40 which did help him. Denies suicidal idea or plan. Needs medications refill for his diarrhea.         Past Medical History:   Diagnosis Date    Asthma     Follows with Dr. Maira Dolan    Hx of blood clots 10/22/2019    PE (after ankle fx)     Past Surgical History:   Procedure Laterality Date    CARPAL TUNNEL RELEASE Left 07/2020    CHOLECYSTECTOMY, LAPAROSCOPIC N/A 10/20/2020    CHOLECYSTECTOMY LAPAROSCOPIC performed by Raisa Dalal MD at [address] [place] [address]  2011    UPPER GASTROINTESTINAL ENDOSCOPY N/A 9/1/2021    EGD BIOPSY performed by Raisa Dalal MD at [facility] ENDOSCOPY     Family History   Problem Relation Age of Onset    High Cholesterol Mother     Cancer Paternal Grandfather      Social History     Socioeconomic History    Marital status: Single     Spouse name: Not on file    Number of children: Not on file    Years of education: Not on file    Highest education level: Not on file   Occupational History    Not on file   Tobacco Use    Smoking status: Every Day     Packs/day: 0.50     Years: 1.00     Pack years: 0.50     Types: Cigarettes     Start date: 2019    Smokeless tobacco: Never   Vaping Use    Vaping Use: Every day    Substances: Nicotine, Flavoring    Devices: Pre-filled or refillable cartridge, Pre-filled pod   Substance and Sexual Activity    Alcohol use: No    Drug use: Not Currently     Types: Marijuana Alfornia Cashing)     Comment: Last used 8/2020    Sexual activity: Never   Other Topics Concern    Not on file   Social History Narrative    ** Merged History Encounter **          Social Determinants of Health     Financial Resource Strain: Medium Risk    Difficulty of Paying Living Expenses: Somewhat hard Food Insecurity: No Food Insecurity    Worried About Running Out of Food in the Last Year: Never true    Ran Out of Food in the Last Year: Never true   Transportation Needs: Unknown    Lack of Transportation (Medical): Not on file    Lack of Transportation (Non-Medical): No   Physical Activity: Not on file   Stress: Not on file   Social Connections: Not on file   Intimate Partner Violence: Not on file   Housing Stability: Unknown    Unable to Pay for Housing in the Last Year: Not on file    Number of Places Lived in the Last Year: Not on file    Unstable Housing in the Last Year: No       No Known Allergies  Current Outpatient Medications   Medication Sig Dispense Refill    loperamide (IMODIUM A-D) 2 MG tablet Take 1 tablet by mouth 2 times daily as needed for Diarrhea 60 tablet 5    acetaminophen (TYLENOL) 325 MG tablet Take 2 tablets by mouth every 6 hours as needed for Pain 120 tablet 3    FLUoxetine (PROZAC) 40 MG capsule Take 1 capsule by mouth daily 30 capsule 5    cholestyramine (QUESTRAN) 4 g packet TAKE 1 PACKET BY MOUTH 2 TIMES DAILY      omeprazole (PRILOSEC) 20 MG delayed release capsule Take 20 mg by mouth daily       No current facility-administered medications for this visit. Review of Systems   Constitutional:  Positive for fatigue (better). Negative for activity change, chills, diaphoresis and fever. HENT:  Negative for congestion. Respiratory:  Negative for cough and shortness of breath. Cardiovascular:  Negative for chest pain. Gastrointestinal:  Positive for diarrhea. Negative for abdominal pain and constipation. Musculoskeletal:  Negative for joint swelling. Neurological:  Negative for dizziness and headaches. Psychiatric/Behavioral:  Positive for agitation and sleep disturbance. Negative for self-injury and suicidal ideas. The patient is nervous/anxious.          All symptoms better     Lab Results   Component Value Date    WBC 4.2 01/14/2022    HGB 15.4 01/14/2022    HCT 44.8 01/14/2022    MCV 94.7 01/14/2022     01/14/2022     Lab Results   Component Value Date     01/14/2022    K 4.3 01/14/2022     01/14/2022    CO2 22 01/14/2022    BUN 17 01/14/2022    CREATININE 0.9 01/14/2022    GLUCOSE 102 (H) 01/15/2021    CALCIUM 8.7 01/14/2022    PROT 6.8 01/14/2022    LABALBU 4.6 01/14/2022    BILITOT 0.5 01/14/2022    ALKPHOS 58 01/14/2022    AST 32 01/14/2022    ALT 25 01/14/2022    LABGLOM >60 01/14/2022    GFRAA >60 01/14/2022    AGRATIO 2.1 01/14/2022     Lab Results   Component Value Date    LABA1C 4.9 01/14/2022     Lab Results   Component Value Date    TSH 0.75 01/14/2022         /84 (Site: Left Upper Arm, Position: Sitting, Cuff Size: Medium Adult)   Pulse 73   Ht 5' 10\" (1.778 m)   Wt 249 lb 12.8 oz (113.3 kg)   SpO2 96%   BMI 35.84 kg/m²     BP Readings from Last 3 Encounters:   02/02/23 138/84   01/11/23 122/81   12/17/22 (!) 128/96       Wt Readings from Last 3 Encounters:   02/02/23 249 lb 12.8 oz (113.3 kg)   01/11/23 250 lb (113.4 kg)   12/21/22 250 lb (113.4 kg)         Physical Exam  Constitutional:       General: He is not in acute distress. Appearance: Normal appearance. He is well-developed. He is not ill-appearing or diaphoretic. HENT:      Head: Normocephalic and atraumatic. Eyes:      General: No scleral icterus. Pupils: Pupils are equal, round, and reactive to light. Cardiovascular:      Rate and Rhythm: Normal rate and regular rhythm. Heart sounds: Normal heart sounds. No murmur heard. Pulmonary:      Effort: Pulmonary effort is normal.      Breath sounds: Normal breath sounds. No wheezing. Musculoskeletal:         General: Normal range of motion. Cervical back: Normal range of motion and neck supple. No rigidity. Right lower leg: No edema. Left lower leg: No edema. Neurological:      General: No focal deficit present. Mental Status: He is alert and oriented to person, place, and time.   Psychiatric:         Mood and Affect: Mood is not anxious. Affect is flat.         Speech: Speech normal.         Behavior: Behavior normal.         Thought Content: Thought content normal.         Cognition and Memory: Cognition normal.         Judgment: Judgment normal.       ASSESSMENT/ PLAN:    1. Anxiety and depression  - stable, getting much better with the prozac    2. Diarrhea, unspecified type  - has h/o cholecystectomy   - loperamide (IMODIUM A-D) 2 MG tablet; Take 1 tablet by mouth 2 times daily as needed for Diarrhea  Dispense: 60 tablet; Refill: 5            - All old blood work reviewed with the patient  - Appropriate prescription are addressed.  - After visit summery provided.  - Questions answered and patient verbalizes understanding.  - Call for any problem, questions, or concerns.  - RTC if symptoms worse.       Return in about 6 months (around 8/2/2023).

## 2023-02-03 PROBLEM — F41.9 ANXIETY AND DEPRESSION: Status: ACTIVE | Noted: 2023-02-03

## 2023-02-03 PROBLEM — R19.7 DIARRHEA: Status: ACTIVE | Noted: 2023-02-03

## 2023-02-03 PROBLEM — F32.A ANXIETY AND DEPRESSION: Status: ACTIVE | Noted: 2023-02-03

## 2023-02-03 ASSESSMENT — ENCOUNTER SYMPTOMS
DIARRHEA: 1
SHORTNESS OF BREATH: 0
COUGH: 0
ABDOMINAL PAIN: 0
CONSTIPATION: 0

## 2023-02-17 ENCOUNTER — OFFICE VISIT (OUTPATIENT)
Dept: FAMILY MEDICINE CLINIC | Age: 23
End: 2023-02-17
Payer: COMMERCIAL

## 2023-02-17 VITALS
OXYGEN SATURATION: 98 % | WEIGHT: 249.2 LBS | HEART RATE: 66 BPM | HEIGHT: 70 IN | BODY MASS INDEX: 35.68 KG/M2 | SYSTOLIC BLOOD PRESSURE: 114 MMHG | DIASTOLIC BLOOD PRESSURE: 84 MMHG

## 2023-02-17 DIAGNOSIS — R68.82 DECREASED LIBIDO: Primary | ICD-10-CM

## 2023-02-17 DIAGNOSIS — F41.9 ANXIETY AND DEPRESSION: ICD-10-CM

## 2023-02-17 DIAGNOSIS — F32.A ANXIETY AND DEPRESSION: ICD-10-CM

## 2023-02-17 PROCEDURE — G8484 FLU IMMUNIZE NO ADMIN: HCPCS | Performed by: FAMILY MEDICINE

## 2023-02-17 PROCEDURE — G8417 CALC BMI ABV UP PARAM F/U: HCPCS | Performed by: FAMILY MEDICINE

## 2023-02-17 PROCEDURE — 99214 OFFICE O/P EST MOD 30 MIN: CPT | Performed by: FAMILY MEDICINE

## 2023-02-17 PROCEDURE — 4004F PT TOBACCO SCREEN RCVD TLK: CPT | Performed by: FAMILY MEDICINE

## 2023-02-17 PROCEDURE — G8427 DOCREV CUR MEDS BY ELIG CLIN: HCPCS | Performed by: FAMILY MEDICINE

## 2023-02-17 RX ORDER — DULOXETIN HYDROCHLORIDE 30 MG/1
30 CAPSULE, DELAYED RELEASE ORAL DAILY
Qty: 30 CAPSULE | Refills: 3 | Status: SHIPPED | OUTPATIENT
Start: 2023-02-17

## 2023-02-17 RX ORDER — FLUOXETINE 10 MG/1
10 CAPSULE ORAL DAILY
Qty: 30 CAPSULE | Refills: 3 | Status: SHIPPED | OUTPATIENT
Start: 2023-02-17

## 2023-02-17 ASSESSMENT — ENCOUNTER SYMPTOMS
SHORTNESS OF BREATH: 0
COUGH: 0

## 2023-02-17 NOTE — PROGRESS NOTES
Radha Yung  2000 02/17/23    Chief Complaint   Patient presents with    Other     Sexual performance issues           Patient here with his girlfriend complaining of his sexual libido his last, patient been on the Prozac for the last 6 months now, anxiety and depression is better.       Past Medical History:   Diagnosis Date    Asthma     Follows with Dr. Kathrine Luna    Hx of blood clots 10/22/2019    PE (after ankle fx)     Past Surgical History:   Procedure Laterality Date    CARPAL TUNNEL RELEASE Left 07/2020    CHOLECYSTECTOMY, LAPAROSCOPIC N/A 10/20/2020    CHOLECYSTECTOMY LAPAROSCOPIC performed by Fabiola Hernandez MD at [address] [facility]  2011    UPPER GASTROINTESTINAL ENDOSCOPY N/A 9/1/2021    EGD BIOPSY performed by Fabiola Hernandez MD at [facility] ENDOSCOPY     Family History   Problem Relation Age of Onset    High Cholesterol Mother     Cancer Paternal Grandfather      Social History     Socioeconomic History    Marital status: Single     Spouse name: Not on file    Number of children: Not on file    Years of education: Not on file    Highest education level: Not on file   Occupational History    Not on file   Tobacco Use    Smoking status: Every Day     Packs/day: 0.50     Years: 1.00     Pack years: 0.50     Types: Cigarettes     Start date: 2019    Smokeless tobacco: Never   Vaping Use    Vaping Use: Every day    Substances: Nicotine, Flavoring    Devices: Pre-filled or refillable cartridge, Pre-filled pod   Substance and Sexual Activity    Alcohol use: No    Drug use: Not Currently     Types: Marijuana Milady Postin)     Comment: Last used 8/2020    Sexual activity: Never   Other Topics Concern    Not on file   Social History Narrative    ** Merged History Encounter **          Social Determinants of Health     Financial Resource Strain: Medium Risk    Difficulty of Paying Living Expenses: Somewhat hard   Food Insecurity: No Food Insecurity    Worried About [address] in the Last Year: Never true    Ran Out of Food in the Last Year: Never true   Transportation Needs: Unknown    Lack of Transportation (Medical): Not on file    Lack of Transportation (Non-Medical): No   Physical Activity: Not on file   Stress: Not on file   Social Connections: Not on file   Intimate Partner Violence: Not on file   Housing Stability: Unknown    Unable to Pay for Housing in the Last Year: Not on file    Number of Places Lived in the Last Year: Not on file    Unstable Housing in the Last Year: No       No Known Allergies  Current Outpatient Medications   Medication Sig Dispense Refill    FLUoxetine (PROZAC) 10 MG capsule Take 1 capsule by mouth daily 30 capsule 3    DULoxetine (CYMBALTA) 30 MG extended release capsule Take 1 capsule by mouth daily 30 capsule 3    loperamide (IMODIUM A-D) 2 MG tablet Take 1 tablet by mouth 2 times daily as needed for Diarrhea 60 tablet 5    cholestyramine (QUESTRAN) 4 g packet TAKE 1 PACKET BY MOUTH 2 TIMES DAILY      omeprazole (PRILOSEC) 20 MG delayed release capsule Take 20 mg by mouth daily       No current facility-administered medications for this visit. Review of Systems   Constitutional:  Negative for activity change, chills, fatigue and fever. Respiratory:  Negative for cough and shortness of breath. Cardiovascular:  Negative for chest pain. Genitourinary:  Negative for dysuria, flank pain and testicular pain. Neurological:  Negative for headaches. Psychiatric/Behavioral:  Negative for agitation and sleep disturbance. The patient is not nervous/anxious.       Lab Results   Component Value Date    WBC 4.2 01/14/2022    HGB 15.4 01/14/2022    HCT 44.8 01/14/2022    MCV 94.7 01/14/2022     01/14/2022     Lab Results   Component Value Date     01/14/2022    K 4.3 01/14/2022     01/14/2022    CO2 22 01/14/2022    BUN 17 01/14/2022    CREATININE 0.9 01/14/2022    GLUCOSE 102 (H) 01/15/2021    CALCIUM 8.7 01/14/2022    PROT 6.8 01/14/2022 LABALBU 4.6 01/14/2022    BILITOT 0.5 01/14/2022    ALKPHOS 58 01/14/2022    AST 32 01/14/2022    ALT 25 01/14/2022    LABGLOM >60 01/14/2022    GFRAA >60 01/14/2022    AGRATIO 2.1 01/14/2022       Lab Results   Component Value Date    LABA1C 4.9 01/14/2022     Lab Results   Component Value Date    TSH 0.75 01/14/2022         /84 (Site: Left Upper Arm, Position: Sitting, Cuff Size: Medium Adult)   Pulse 66   Ht 5' 10\" (1.778 m)   Wt 249 lb 3.2 oz (113 kg)   SpO2 98%   BMI 35.76 kg/m²     BP Readings from Last 3 Encounters:   02/17/23 114/84   02/02/23 138/84   01/11/23 122/81       Wt Readings from Last 3 Encounters:   02/17/23 249 lb 3.2 oz (113 kg)   02/02/23 249 lb 12.8 oz (113.3 kg)   01/11/23 250 lb (113.4 kg)         Physical Exam  Constitutional:       Appearance: Normal appearance. Cardiovascular:      Rate and Rhythm: Normal rate and regular rhythm. Heart sounds: No murmur heard. Pulmonary:      Effort: Pulmonary effort is normal.      Breath sounds: Normal breath sounds. Musculoskeletal:      Cervical back: Normal range of motion. Neurological:      Mental Status: He is alert and oriented to person, place, and time. Psychiatric:         Mood and Affect: Mood normal.         Behavior: Behavior normal.       ASSESSMENT/ PLAN:    1. Decreased libido  -Most likely it is a side effect of the medication so we will discontinue the Prozac gradually and we will start him on Cymbalta for anxiety and depression    2. Anxiety and depression  -Discontinued the Prozac gradually, take 20 mg for 3 days and take 10 mg daily for another 3 days and then stop taking the Prozac  - FLUoxetine (PROZAC) 10 MG capsule; Take 1 capsule by mouth daily  Dispense: 30 capsule; Refill: 3  - DULoxetine (CYMBALTA) 30 MG extended release capsule; Take 1 capsule by mouth daily  Dispense: 30 capsule; Refill: 3            - All old blood work reviewed with the patient  - Appropriate prescription are addressed.   - After visit jem provided. - Questions answered and patient verbalizes understanding.  - Call for any problem, questions, or concerns.  - RTC if symptoms worse. Return in about 8 weeks (around 4/14/2023).   His anxiety and depression

## 2023-02-22 DIAGNOSIS — F32.A ANXIETY AND DEPRESSION: ICD-10-CM

## 2023-02-22 DIAGNOSIS — F41.9 ANXIETY AND DEPRESSION: ICD-10-CM

## 2023-02-22 RX ORDER — FLUOXETINE 10 MG/1
10 CAPSULE ORAL DAILY
Qty: 30 CAPSULE | Refills: 3 | Status: SHIPPED | OUTPATIENT
Start: 2023-02-22

## 2023-03-07 DIAGNOSIS — F41.9 ANXIETY AND DEPRESSION: ICD-10-CM

## 2023-03-07 DIAGNOSIS — F32.A ANXIETY AND DEPRESSION: ICD-10-CM

## 2023-03-07 RX ORDER — FLUOXETINE 10 MG/1
10 CAPSULE ORAL DAILY
Qty: 90 CAPSULE | Refills: 3 | Status: SHIPPED | OUTPATIENT
Start: 2023-03-07

## 2023-03-14 DIAGNOSIS — F32.A ANXIETY AND DEPRESSION: ICD-10-CM

## 2023-03-14 DIAGNOSIS — F41.9 ANXIETY AND DEPRESSION: ICD-10-CM

## 2023-03-14 RX ORDER — DULOXETIN HYDROCHLORIDE 30 MG/1
30 CAPSULE, DELAYED RELEASE ORAL DAILY
Qty: 90 CAPSULE | Refills: 3 | Status: SHIPPED | OUTPATIENT
Start: 2023-03-14

## 2023-04-03 ENCOUNTER — OFFICE VISIT (OUTPATIENT)
Dept: ORTHOPEDIC SURGERY | Age: 23
End: 2023-04-03

## 2023-04-03 ENCOUNTER — HOSPITAL ENCOUNTER (OUTPATIENT)
Age: 23
Discharge: HOME | End: 2023-04-03
Payer: COMMERCIAL

## 2023-04-03 ENCOUNTER — HOSPITAL ENCOUNTER (OUTPATIENT)
Dept: GENERAL RADIOLOGY | Age: 23
Discharge: HOME | End: 2023-04-03
Payer: COMMERCIAL

## 2023-04-03 VITALS
OXYGEN SATURATION: 98 % | DIASTOLIC BLOOD PRESSURE: 81 MMHG | BODY MASS INDEX: 35.79 KG/M2 | HEART RATE: 93 BPM | HEIGHT: 70 IN | SYSTOLIC BLOOD PRESSURE: 139 MMHG | WEIGHT: 250 LBS

## 2023-04-03 DIAGNOSIS — M79.672 LEFT FOOT PAIN: Primary | ICD-10-CM

## 2023-04-03 DIAGNOSIS — M79.672 LEFT FOOT PAIN: ICD-10-CM

## 2023-04-03 PROCEDURE — 73630 X-RAY EXAM OF FOOT: CPT

## 2023-04-03 RX ORDER — LOPERAMIDE HYDROCHLORIDE 2 MG/1
CAPSULE ORAL
COMMUNITY
Start: 2023-03-16

## 2023-04-03 NOTE — PROGRESS NOTES
Patient comes in today with left foot injury 3/20/23. He states that he tripped over a skid and he felt a pop when he landed. He had initial x-rays at [facility]. He rates his pain at 0/10 today. His pain is located at the base of the 5th metatarsal. He is wearing a post op shoe and ambulating with 2 crutches. He reports a previous left 5th metatarsal fracture in October of 2019. He denies any new falls or injuries. X-rays were taken today.
reviewed and updated as appropriate.     Past Medical History:   Diagnosis Date    Asthma     Follows with Dr. Lexx Meneses    Hx of blood clots 10/22/2019    PE (after ankle fx)     Past Surgical History:   Procedure Laterality Date    CARPAL TUNNEL RELEASE Left 07/2020    CHOLECYSTECTOMY, LAPAROSCOPIC N/A 10/20/2020    CHOLECYSTECTOMY LAPAROSCOPIC performed by Lulú Jovel MD at [address] [facility]    UPPER GASTROINTESTINAL ENDOSCOPY N/A 9/1/2021    EGD BIOPSY performed by Lulú Jovel MD at [facility] ENDOSCOPY     Family History   Problem Relation Age of Onset    High Cholesterol Mother     Cancer Paternal Grandfather      Social History     Socioeconomic History    Marital status: Single   Tobacco Use    Smoking status: Every Day     Packs/day: 0.50     Years: 1.00     Pack years: 0.50     Types: Cigarettes     Start date: 2019    Smokeless tobacco: Never   Vaping Use    Vaping Use: Every day    Substances: Nicotine, Flavoring    Devices: Pre-filled or refillable cartridge, Pre-filled pod   Substance and Sexual Activity    Alcohol use: No    Drug use: Not Currently     Types: Marijuana Garrel Calender)     Comment: Last used 8/2020    Sexual activity: Never   Social History Narrative    ** Merged History Encounter **          Social Determinants of Health     Financial Resource Strain: Medium Risk    Difficulty of Paying Living Expenses: Somewhat hard   Food Insecurity: No Food Insecurity    Worried About Running Out of Food in the Last Year: Never true    Ran Out of Food in the Last Year: Never true   Transportation Needs: Unknown    Lack of Transportation (Non-Medical): No   Housing Stability: Unknown    Unstable Housing in the Last Year: No     Current Outpatient Medications   Medication Sig Dispense Refill    loperamide (IMODIUM) 2 MG capsule TAKE 1 TABLET BY MOUTH 2 TIMES DAILY AS NEEDED FOR DIARRHEA      DULoxetine (CYMBALTA) 30 MG extended release capsule Take 1 capsule by mouth daily 90 capsule 3

## 2023-04-05 ASSESSMENT — ENCOUNTER SYMPTOMS
NAUSEA: 0
WHEEZING: 0
SORE THROAT: 0
COLOR CHANGE: 0
VOICE CHANGE: 0
VOMITING: 0
COUGH: 0
SHORTNESS OF BREATH: 0
BACK PAIN: 0

## 2023-04-06 NOTE — PATIENT INSTRUCTIONS
Lateral J brace given today. Wear when active. Home exercises given today. Follow up in 3 months. (1) Outpatient Area

## 2023-04-13 PROBLEM — S92.352D DISPLACED FRACTURE OF FIFTH METATARSAL BONE OF LEFT FOOT WITH ROUTINE HEALING: Status: ACTIVE | Noted: 2023-04-13

## 2023-04-17 DIAGNOSIS — F32.A ANXIETY AND DEPRESSION: ICD-10-CM

## 2023-04-17 DIAGNOSIS — F41.9 ANXIETY AND DEPRESSION: ICD-10-CM

## 2023-04-17 RX ORDER — FLUOXETINE 10 MG/1
10 CAPSULE ORAL DAILY
Qty: 90 CAPSULE | Refills: 3 | Status: SHIPPED | OUTPATIENT
Start: 2023-04-17

## 2023-04-26 ENCOUNTER — OFFICE VISIT (OUTPATIENT)
Dept: ORTHOPEDIC SURGERY | Age: 23
End: 2023-04-26
Payer: COMMERCIAL

## 2023-04-26 VITALS — OXYGEN SATURATION: 98 % | SYSTOLIC BLOOD PRESSURE: 134 MMHG | DIASTOLIC BLOOD PRESSURE: 97 MMHG | HEART RATE: 75 BPM

## 2023-04-26 DIAGNOSIS — S92.352D CLOSED DISPLACED FRACTURE OF FIFTH METATARSAL BONE OF LEFT FOOT WITH ROUTINE HEALING, SUBSEQUENT ENCOUNTER: Primary | ICD-10-CM

## 2023-04-26 PROCEDURE — G8427 DOCREV CUR MEDS BY ELIG CLIN: HCPCS | Performed by: STUDENT IN AN ORGANIZED HEALTH CARE EDUCATION/TRAINING PROGRAM

## 2023-04-26 PROCEDURE — G8417 CALC BMI ABV UP PARAM F/U: HCPCS | Performed by: STUDENT IN AN ORGANIZED HEALTH CARE EDUCATION/TRAINING PROGRAM

## 2023-04-26 PROCEDURE — 99213 OFFICE O/P EST LOW 20 MIN: CPT | Performed by: STUDENT IN AN ORGANIZED HEALTH CARE EDUCATION/TRAINING PROGRAM

## 2023-04-26 PROCEDURE — 4004F PT TOBACCO SCREEN RCVD TLK: CPT | Performed by: STUDENT IN AN ORGANIZED HEALTH CARE EDUCATION/TRAINING PROGRAM

## 2023-04-26 ASSESSMENT — ENCOUNTER SYMPTOMS
BACK PAIN: 0
NAUSEA: 0
SHORTNESS OF BREATH: 0
COLOR CHANGE: 0
WHEEZING: 0
VOICE CHANGE: 0
SORE THROAT: 0
VOMITING: 0
COUGH: 0

## 2023-04-26 NOTE — PROGRESS NOTES
Patient is here for 4 week follow up on left foot fracture; closed displaced fracture of fifth metatarsal bone of left foot. Patient has been wearing boot as directed. Has been walking short distances without boot -- full weightbearing. No pain today, no numbness or tingling. Persistent about returning to work. No specific questions or concerns. New imaging today.

## 2023-04-26 NOTE — PROGRESS NOTES
4/26/2023   Chief Complaint   Patient presents with    Follow-up     Follow up on left foot, 5th metatarsal fracture. Updated HPI: Patient returns today for reevaluation of left foot. Patient denies any painful events since last being seen. He continues to have no pain at the fifth metatarsal fracture site. She states that he has spent some time out of the boot ambulating and without any pain. No new issues at this time. Is eager to return to work if possible    Previous HPI (4/13/2023): Patient returns today for reevaluation of left foot. Patient was able to progress to full WBAT in walking boot as he continues to have no pain or limp with ambulation. No new complaints including new injury or new orthopedic complaint. Patient states he has been fully compliant with restrictions without issue. Previous HPI (4/3/2023):                             Collette Coots is a 25 y.o. male previously seen by myself for knee pain who is here for left foot pain. This injury did occur at work. He states that he tripped over his skin felt a pop and pain at the fifth metatarsal.  He was wearing work boots at the time and is currently in a postop shoe. He was seen in occupational health where x-rays demonstrated 5th metatarsal fracture which he does have a history of. It was read as possibly a new fracture. He was placed on crutches and made nonweightbearing until to follow-up with orthopedics. The prior fifth metatarsal fracture occurred in October 2019 and it healed after prolonged course of nonoperative treatment which included nonweightbearing for several months. He states the pain feels somewhat different and is more distal in the fifth metatarsal at this time. He has no true pain at the actual previous fracture site. This is my first time seeing him for the injury. No advanced imaging thus far. The pain's location is fifth metatarsal shaft of the right foot.  he describes the symptoms as

## 2023-05-24 ENCOUNTER — OFFICE VISIT (OUTPATIENT)
Dept: ORTHOPEDIC SURGERY | Age: 23
End: 2023-05-24
Payer: COMMERCIAL

## 2023-05-24 VITALS — HEART RATE: 98 BPM | HEIGHT: 70 IN | WEIGHT: 264.8 LBS | OXYGEN SATURATION: 97 % | BODY MASS INDEX: 37.91 KG/M2

## 2023-05-24 DIAGNOSIS — S93.601A SPRAIN OF RIGHT FOOT, INITIAL ENCOUNTER: ICD-10-CM

## 2023-05-24 DIAGNOSIS — S92.352D CLOSED DISPLACED FRACTURE OF FIFTH METATARSAL BONE OF LEFT FOOT WITH ROUTINE HEALING, SUBSEQUENT ENCOUNTER: Primary | ICD-10-CM

## 2023-05-24 PROCEDURE — 4004F PT TOBACCO SCREEN RCVD TLK: CPT | Performed by: STUDENT IN AN ORGANIZED HEALTH CARE EDUCATION/TRAINING PROGRAM

## 2023-05-24 PROCEDURE — G8417 CALC BMI ABV UP PARAM F/U: HCPCS | Performed by: STUDENT IN AN ORGANIZED HEALTH CARE EDUCATION/TRAINING PROGRAM

## 2023-05-24 PROCEDURE — G8427 DOCREV CUR MEDS BY ELIG CLIN: HCPCS | Performed by: STUDENT IN AN ORGANIZED HEALTH CARE EDUCATION/TRAINING PROGRAM

## 2023-05-24 PROCEDURE — 99213 OFFICE O/P EST LOW 20 MIN: CPT | Performed by: STUDENT IN AN ORGANIZED HEALTH CARE EDUCATION/TRAINING PROGRAM

## 2023-05-24 ASSESSMENT — ENCOUNTER SYMPTOMS
WHEEZING: 0
NAUSEA: 0
COUGH: 0
BACK PAIN: 0
VOICE CHANGE: 0
SORE THROAT: 0
SHORTNESS OF BREATH: 0
VOMITING: 0
COLOR CHANGE: 0

## 2023-05-24 NOTE — PATIENT INSTRUCTIONS
No running or jumping. May weight bear as tolerated. Ankle brace given today. Follow up in 2 months.

## 2023-05-24 NOTE — PROGRESS NOTES
5/24/2023   Chief Complaint   Patient presents with    Follow-up     Left foot 5th metatarsal fracture      Updated HPI: Patient returns today for reevaluation of his left foot. Patient states he is doing well and has had no painful events and feels that he is improving. He does however have some pain of the right foot and is requesting x-rays today. He denies any injury to the right foot but states this pain feels somewhat similar to what he had in the left foot before and wants to ensure he does not have a similar type injury. No additional injuries or complaints. No additional changes to his health history. He states his pain is improved but does have soreness especially after working a new job which involves him being on his feet for extended periods of time although this is better than his previous job. Previous HPI (4/26/2023): Patient returns today for reevaluation of left foot. Patient denies any painful events since last being seen. He continues to have no pain at the fifth metatarsal fracture site. She states that he has spent some time out of the boot ambulating and without any pain. No new issues at this time. Is eager to return to work if possible    Previous HPI (4/13/2023): Patient returns today for reevaluation of left foot. Patient was able to progress to full WBAT in walking boot as he continues to have no pain or limp with ambulation. No new complaints including new injury or new orthopedic complaint. Patient states he has been fully compliant with restrictions without issue. Previous HPI (4/3/2023):                             Maria De Jesus Clarke is a 25 y.o. male previously seen by myself for knee pain who is here for left foot pain. This injury did occur at work. He states that he tripped over his skin felt a pop and pain at the fifth metatarsal.  He was wearing work boots at the time and is currently in a postop shoe.   He was seen in occupational health where x-rays

## 2023-05-25 PROBLEM — S93.601A SPRAIN OF RIGHT FOOT: Status: ACTIVE | Noted: 2023-05-25

## 2023-07-06 ENCOUNTER — TELEPHONE (OUTPATIENT)
Dept: FAMILY MEDICINE CLINIC | Age: 23
End: 2023-07-06

## 2023-08-11 ENCOUNTER — OFFICE VISIT (OUTPATIENT)
Dept: ORTHOPEDIC SURGERY | Age: 23
End: 2023-08-11
Payer: COMMERCIAL

## 2023-08-11 VITALS — SYSTOLIC BLOOD PRESSURE: 134 MMHG | DIASTOLIC BLOOD PRESSURE: 88 MMHG | HEART RATE: 78 BPM | OXYGEN SATURATION: 98 %

## 2023-08-11 DIAGNOSIS — S92.352D CLOSED DISPLACED FRACTURE OF FIFTH METATARSAL BONE OF LEFT FOOT WITH ROUTINE HEALING, SUBSEQUENT ENCOUNTER: Primary | ICD-10-CM

## 2023-08-11 PROCEDURE — 99213 OFFICE O/P EST LOW 20 MIN: CPT | Performed by: STUDENT IN AN ORGANIZED HEALTH CARE EDUCATION/TRAINING PROGRAM

## 2023-08-11 PROCEDURE — G8427 DOCREV CUR MEDS BY ELIG CLIN: HCPCS | Performed by: STUDENT IN AN ORGANIZED HEALTH CARE EDUCATION/TRAINING PROGRAM

## 2023-08-11 PROCEDURE — G8417 CALC BMI ABV UP PARAM F/U: HCPCS | Performed by: STUDENT IN AN ORGANIZED HEALTH CARE EDUCATION/TRAINING PROGRAM

## 2023-08-11 PROCEDURE — 4004F PT TOBACCO SCREEN RCVD TLK: CPT | Performed by: STUDENT IN AN ORGANIZED HEALTH CARE EDUCATION/TRAINING PROGRAM

## 2023-08-11 ASSESSMENT — ENCOUNTER SYMPTOMS
VOMITING: 0
SORE THROAT: 0
COLOR CHANGE: 0
VOICE CHANGE: 0
COUGH: 0
SHORTNESS OF BREATH: 0
WHEEZING: 0
NAUSEA: 0
BACK PAIN: 0

## 2023-08-11 NOTE — PROGRESS NOTES
Patient states his foot feels normal. No pain, numbness or tingling. States he has had long days on his feet without issue. No new injuries. Last seen 5/24/23.
months but I do want him to avoid type of high-impact activity for 3 months and again stressed importance of gradual return to activities. I did recommend him continuing to use a hard soled shoe as well as hightops when exercising or doing any type of extensive exercise. He will follow-up with me as needed. He can continue to ice and over-the-counter pain medication for pain control. All questions answered and patient voices understanding.     Electronically signed by David Garza DO on 8/11/2023 at 9:09 AM

## 2023-08-17 ENCOUNTER — OFFICE VISIT (OUTPATIENT)
Dept: FAMILY MEDICINE CLINIC | Age: 23
End: 2023-08-17
Payer: COMMERCIAL

## 2023-08-17 VITALS
HEART RATE: 95 BPM | DIASTOLIC BLOOD PRESSURE: 78 MMHG | WEIGHT: 256.6 LBS | HEIGHT: 70 IN | OXYGEN SATURATION: 95 % | BODY MASS INDEX: 36.73 KG/M2 | TEMPERATURE: 96.9 F | SYSTOLIC BLOOD PRESSURE: 128 MMHG

## 2023-08-17 DIAGNOSIS — F41.9 ANXIETY AND DEPRESSION: ICD-10-CM

## 2023-08-17 DIAGNOSIS — Z76.89 ENCOUNTER TO ESTABLISH CARE: Primary | ICD-10-CM

## 2023-08-17 DIAGNOSIS — J31.0 RHINITIS, UNSPECIFIED TYPE: ICD-10-CM

## 2023-08-17 DIAGNOSIS — R68.82 DECREASED LIBIDO: ICD-10-CM

## 2023-08-17 DIAGNOSIS — E66.09 CLASS 2 OBESITY DUE TO EXCESS CALORIES WITHOUT SERIOUS COMORBIDITY WITH BODY MASS INDEX (BMI) OF 36.0 TO 36.9 IN ADULT: ICD-10-CM

## 2023-08-17 DIAGNOSIS — Z86.711 HISTORY OF PULMONARY EMBOLISM: ICD-10-CM

## 2023-08-17 DIAGNOSIS — T07.XXXA FRACTURES, MULTIPLE: ICD-10-CM

## 2023-08-17 DIAGNOSIS — F17.200 SMOKER: ICD-10-CM

## 2023-08-17 DIAGNOSIS — E53.8 FOLATE DEFICIENCY: ICD-10-CM

## 2023-08-17 DIAGNOSIS — F32.A ANXIETY AND DEPRESSION: ICD-10-CM

## 2023-08-17 DIAGNOSIS — Z86.718 H/O DEEP VENOUS THROMBOSIS: ICD-10-CM

## 2023-08-17 DIAGNOSIS — Z13.1 SCREENING FOR DIABETES MELLITUS: ICD-10-CM

## 2023-08-17 PROBLEM — R53.83 FATIGUE: Status: RESOLVED | Noted: 2022-01-15 | Resolved: 2023-08-17

## 2023-08-17 PROBLEM — Z87.81 HISTORY OF FRACTURE OF LEFT ANKLE: Status: RESOLVED | Noted: 2020-03-21 | Resolved: 2023-08-17

## 2023-08-17 PROBLEM — R11.0 NAUSEA: Status: RESOLVED | Noted: 2022-02-11 | Resolved: 2023-08-17

## 2023-08-17 PROBLEM — M22.41 CHONDROMALACIA OF RIGHT PATELLOFEMORAL JOINT: Status: RESOLVED | Noted: 2023-01-12 | Resolved: 2023-08-17

## 2023-08-17 PROBLEM — R19.7 DIARRHEA: Status: RESOLVED | Noted: 2023-02-03 | Resolved: 2023-08-17

## 2023-08-17 PROBLEM — S92.352D DISPLACED FRACTURE OF FIFTH METATARSAL BONE OF LEFT FOOT WITH ROUTINE HEALING: Status: RESOLVED | Noted: 2023-04-13 | Resolved: 2023-08-17

## 2023-08-17 PROBLEM — S93.601A SPRAIN OF RIGHT FOOT: Status: RESOLVED | Noted: 2023-05-25 | Resolved: 2023-08-17

## 2023-08-17 PROBLEM — E66.812 CLASS 2 OBESITY DUE TO EXCESS CALORIES WITHOUT SERIOUS COMORBIDITY WITH BODY MASS INDEX (BMI) OF 36.0 TO 36.9 IN ADULT: Status: ACTIVE | Noted: 2023-08-17

## 2023-08-17 PROBLEM — F32.9 REACTIVE DEPRESSION: Status: RESOLVED | Noted: 2022-01-15 | Resolved: 2023-08-17

## 2023-08-17 PROCEDURE — 99214 OFFICE O/P EST MOD 30 MIN: CPT | Performed by: NURSE PRACTITIONER

## 2023-08-17 PROCEDURE — G8417 CALC BMI ABV UP PARAM F/U: HCPCS | Performed by: NURSE PRACTITIONER

## 2023-08-17 PROCEDURE — G8427 DOCREV CUR MEDS BY ELIG CLIN: HCPCS | Performed by: NURSE PRACTITIONER

## 2023-08-17 PROCEDURE — 4004F PT TOBACCO SCREEN RCVD TLK: CPT | Performed by: NURSE PRACTITIONER

## 2023-08-17 RX ORDER — FLUTICASONE PROPIONATE 50 MCG
2 SPRAY, SUSPENSION (ML) NASAL DAILY PRN
Qty: 48 G | Refills: 1 | Status: SHIPPED | OUTPATIENT
Start: 2023-08-17

## 2023-08-17 RX ORDER — ASPIRIN 81 MG/1
81 TABLET ORAL DAILY
Qty: 90 TABLET | Refills: 1 | Status: SHIPPED | OUTPATIENT
Start: 2023-08-17

## 2023-08-17 RX ORDER — CETIRIZINE HYDROCHLORIDE 10 MG/1
10 TABLET ORAL DAILY
Qty: 90 TABLET | Refills: 1 | Status: CANCELLED | OUTPATIENT
Start: 2023-08-17

## 2023-08-17 RX ORDER — DOXYCYCLINE 100 MG/1
CAPSULE ORAL
COMMUNITY
Start: 2023-08-15

## 2023-08-17 NOTE — PROGRESS NOTES
2023     Heath Silverman (:  2000) is a 25 y.o. male, here for evaluation of the following medical concerns:        Est care   Prev pt dr Vlad Hubbard depression   Had libido problem with medications so stopped taking   Has been on cymalta prozac   States he is \"just dealing with the days as they come\"   \" I would rather deal with anxiety than my thing not work\"   2-4 beers 7 days per week   Wknds 6-12 beers   States its more depression   Self destruction mindset   Hx panic attacks  - none recent   No suicidal self harm TPI  He does not want any medication or therapy at this time       Smoker   Vaping and cigarettes. He is not ready to quit. Declines patches       Also breathes in welding fumes all day       CT bilateral wrists. Left surgery      Hx PE and DVT   Dr mora with heme following. Recommended to take asa 80- he is not taking   Was non weight bearing and sedentary with metatarsal fracture. Had chest pain with PE and DVT left leg. He was also put on folic acid P87       Chronic Diarrhea since lap pati   Imodium prn   Intermittent   No bleeding   Better than it used to be. No gallbladder. Removed   Had a arterial clot with lap pati that went to testicle      URI  Has a cough   Starting to feel better now   He has had steroids and azithromycin which didn't help. He is now on doxy and is starting to do better. Following with [facility] for left  5th metatarsal   Fracture 2023. Tripped over palet at work   Also fractured same bone  just while walking at work. He is wanting a DEXA bone scan    Reports low folate b12 and was taking supp years ago but not lately           Review of Systems    Prior to Visit Medications    Medication Sig Taking?  Authorizing Provider   doxycycline monohydrate (MONODOX) 100 MG capsule  Yes Historical Provider, MD   fluticasone (FLONASE) 50 MCG/ACT nasal spray 2 sprays by Each Nostril route daily as needed for

## 2023-12-04 ENCOUNTER — HOSPITAL ENCOUNTER (OUTPATIENT)
Dept: GENERAL RADIOLOGY | Age: 23
Discharge: HOME | End: 2023-12-04
Payer: COMMERCIAL

## 2023-12-04 ENCOUNTER — OFFICE VISIT (OUTPATIENT)
Dept: FAMILY MEDICINE CLINIC | Age: 23
End: 2023-12-04
Payer: COMMERCIAL

## 2023-12-04 ENCOUNTER — HOSPITAL ENCOUNTER (OUTPATIENT)
Age: 23
Discharge: HOME | End: 2023-12-04
Payer: COMMERCIAL

## 2023-12-04 VITALS
SYSTOLIC BLOOD PRESSURE: 122 MMHG | BODY MASS INDEX: 37.48 KG/M2 | TEMPERATURE: 98.8 F | DIASTOLIC BLOOD PRESSURE: 80 MMHG | WEIGHT: 261.2 LBS | OXYGEN SATURATION: 98 % | HEART RATE: 73 BPM

## 2023-12-04 DIAGNOSIS — M25.532 LEFT WRIST PAIN: ICD-10-CM

## 2023-12-04 DIAGNOSIS — M79.642 LEFT HAND PAIN: Primary | ICD-10-CM

## 2023-12-04 PROCEDURE — 99213 OFFICE O/P EST LOW 20 MIN: CPT | Performed by: NURSE PRACTITIONER

## 2023-12-04 PROCEDURE — 4004F PT TOBACCO SCREEN RCVD TLK: CPT | Performed by: NURSE PRACTITIONER

## 2023-12-04 PROCEDURE — 73110 X-RAY EXAM OF WRIST: CPT

## 2023-12-04 PROCEDURE — G8417 CALC BMI ABV UP PARAM F/U: HCPCS | Performed by: NURSE PRACTITIONER

## 2023-12-04 PROCEDURE — G8484 FLU IMMUNIZE NO ADMIN: HCPCS | Performed by: NURSE PRACTITIONER

## 2023-12-04 PROCEDURE — G8427 DOCREV CUR MEDS BY ELIG CLIN: HCPCS | Performed by: NURSE PRACTITIONER

## 2023-12-04 RX ORDER — DEXTROMETHORPHAN HYDROBROMIDE AND PROMETHAZINE HYDROCHLORIDE 15; 6.25 MG/5ML; MG/5ML
SYRUP ORAL
COMMUNITY
Start: 2023-12-02

## 2023-12-04 ASSESSMENT — ENCOUNTER SYMPTOMS
COUGH: 0
CHEST TIGHTNESS: 0
SHORTNESS OF BREATH: 0
WHEEZING: 0
DIARRHEA: 0
VOMITING: 0
SINUS PRESSURE: 0
SINUS PAIN: 0
NAUSEA: 0
RHINORRHEA: 0
SORE THROAT: 0

## 2023-12-04 NOTE — PROGRESS NOTES
Екатерина Zimmerman   26 y.o.  male  [id number]      Chief Complaint   Patient presents with    Hand Injury     Left hand/wrist        Subjective:  23 y.o.male is here for a follow up. He has the following chronic/acute medical problems:  Patient Active Problem List   Diagnosis    Smoker    Anxiety and depression    Decreased libido    Class 2 obesity due to excess calories without serious comorbidity with body mass index (BMI) of 36.0 to 36.9 in adult    Folate deficiency    Fractures, multiple       Patient is here with complaints of left hand/left wrist injury. Patient states this happened Friday night. Patient states he tripped and caught his hand to catch himself. Patient states he does have some swelling. Patient states his pain to be 1 - 2 right now sitting. Patient states he has taken Tylenol to help with the pain. Patient states he went to Urgent Care over the weekend - states an xray done of the left hand - states no fracture or dislocation seen. Patient states he does not understand why they did not xray his wrist also. Patient is requesting an xray of the left wrist.         Review of Systems   Constitutional:  Negative for appetite change, chills, fatigue and fever. HENT:  Negative for congestion, ear pain, postnasal drip, rhinorrhea, sinus pressure, sinus pain, sneezing and sore throat. Respiratory:  Negative for cough, chest tightness, shortness of breath and wheezing. Cardiovascular:  Negative for chest pain and palpitations. Gastrointestinal:  Negative for diarrhea, nausea and vomiting. Skin:  Negative for rash. Neurological:  Negative for dizziness, light-headedness and headaches.        Current Outpatient Medications   Medication Sig Dispense Refill    promethazine-dextromethorphan (PROMETHAZINE-DM) 6.25-15 MG/5ML syrup TAKE 5 ML (ORAL) 3 TIMES PER DAY AS NEEDED      loperamide (IMODIUM) 2 MG capsule TAKE 1 TABLET BY MOUTH 2 TIMES DAILY AS NEEDED FOR DIARRHEA      vitamin D

## 2023-12-07 ENCOUNTER — OFFICE VISIT (OUTPATIENT)
Dept: ORTHOPEDIC SURGERY | Age: 23
End: 2023-12-07
Payer: COMMERCIAL

## 2023-12-07 VITALS
HEART RATE: 98 BPM | HEIGHT: 70 IN | BODY MASS INDEX: 37.37 KG/M2 | OXYGEN SATURATION: 97 % | WEIGHT: 261 LBS | RESPIRATION RATE: 16 BRPM

## 2023-12-07 DIAGNOSIS — S63.502A SPRAIN OF LEFT WRIST, INITIAL ENCOUNTER: Primary | ICD-10-CM

## 2023-12-07 PROCEDURE — 4004F PT TOBACCO SCREEN RCVD TLK: CPT

## 2023-12-07 PROCEDURE — G8484 FLU IMMUNIZE NO ADMIN: HCPCS

## 2023-12-07 PROCEDURE — 99213 OFFICE O/P EST LOW 20 MIN: CPT

## 2023-12-07 PROCEDURE — G8417 CALC BMI ABV UP PARAM F/U: HCPCS

## 2023-12-07 PROCEDURE — G8427 DOCREV CUR MEDS BY ELIG CLIN: HCPCS

## 2023-12-07 ASSESSMENT — ENCOUNTER SYMPTOMS
FACIAL SWELLING: 0
NAUSEA: 0
COUGH: 0
SHORTNESS OF BREATH: 0
BACK PAIN: 0
RHINORRHEA: 0

## 2023-12-07 NOTE — PATIENT INSTRUCTIONS
Left wrist brace given in office today please use for the next 3-4 weeks when doing any heavy lifting, pushing, or pulling or any type of activities with the left hand  Continue weight-bearing as tolerated. Continue range of motion exercises as instructed. Ice and elevate as needed. Tylenol or Motrin for pain. Follow up in 4-6 weeks if you are still having pains with the left wrist    We are committed to providing you the best care possible. If you receive a survey after visiting one of our offices, please take time to share your experience concerning your physician office visit. These surveys are confidential and no health information about you is shared. We are eager to improve for you and we are counting on your feedback to help make that happen.

## 2024-02-02 ENCOUNTER — TELEPHONE (OUTPATIENT)
Dept: FAMILY MEDICINE CLINIC | Age: 24
End: 2024-02-02

## 2024-02-02 NOTE — TELEPHONE ENCOUNTER
----- Message from Melissa Clarke sent at 2/2/2024  9:17 AM EST -----  Subject: Message to Provider    QUESTIONS  Information for Provider? Patient would like an order for a full STD blood   panel done. Mynor vaughn.   ---------------------------------------------------------------------------  --------------  CALL BACK INFO  [phone]; OK to leave message on voicemail  ---------------------------------------------------------------------------  --------------  SCRIPT ANSWERS  Relationship to Patient? Self

## 2024-03-19 SDOH — ECONOMIC STABILITY: FOOD INSECURITY: WITHIN THE PAST 12 MONTHS, YOU WORRIED THAT YOUR FOOD WOULD RUN OUT BEFORE YOU GOT MONEY TO BUY MORE.: NEVER TRUE

## 2024-03-19 SDOH — ECONOMIC STABILITY: FOOD INSECURITY: WITHIN THE PAST 12 MONTHS, THE FOOD YOU BOUGHT JUST DIDN'T LAST AND YOU DIDN'T HAVE MONEY TO GET MORE.: NEVER TRUE

## 2024-03-19 SDOH — ECONOMIC STABILITY: INCOME INSECURITY: HOW HARD IS IT FOR YOU TO PAY FOR THE VERY BASICS LIKE FOOD, HOUSING, MEDICAL CARE, AND HEATING?: SOMEWHAT HARD

## 2024-03-19 SDOH — ECONOMIC STABILITY: TRANSPORTATION INSECURITY
IN THE PAST 12 MONTHS, HAS LACK OF TRANSPORTATION KEPT YOU FROM MEETINGS, WORK, OR FROM GETTING THINGS NEEDED FOR DAILY LIVING?: NO

## 2024-03-19 ASSESSMENT — PATIENT HEALTH QUESTIONNAIRE - PHQ9
SUM OF ALL RESPONSES TO PHQ QUESTIONS 1-9: 13
7. TROUBLE CONCENTRATING ON THINGS, SUCH AS READING THE NEWSPAPER OR WATCHING TELEVISION: SEVERAL DAYS
6. FEELING BAD ABOUT YOURSELF - OR THAT YOU ARE A FAILURE OR HAVE LET YOURSELF OR YOUR FAMILY DOWN: SEVERAL DAYS
5. POOR APPETITE OR OVEREATING: NEARLY EVERY DAY
8. MOVING OR SPEAKING SO SLOWLY THAT OTHER PEOPLE COULD HAVE NOTICED. OR THE OPPOSITE, BEING SO FIGETY OR RESTLESS THAT YOU HAVE BEEN MOVING AROUND A LOT MORE THAN USUAL: NOT AT ALL
6. FEELING BAD ABOUT YOURSELF - OR THAT YOU ARE A FAILURE OR HAVE LET YOURSELF OR YOUR FAMILY DOWN: SEVERAL DAYS
10. IF YOU CHECKED OFF ANY PROBLEMS, HOW DIFFICULT HAVE THESE PROBLEMS MADE IT FOR YOU TO DO YOUR WORK, TAKE CARE OF THINGS AT HOME, OR GET ALONG WITH OTHER PEOPLE: VERY DIFFICULT
7. TROUBLE CONCENTRATING ON THINGS, SUCH AS READING THE NEWSPAPER OR WATCHING TELEVISION: SEVERAL DAYS
2. FEELING DOWN, DEPRESSED OR HOPELESS: SEVERAL DAYS
SUM OF ALL RESPONSES TO PHQ QUESTIONS 1-9: 13
2. FEELING DOWN, DEPRESSED OR HOPELESS: SEVERAL DAYS
SUM OF ALL RESPONSES TO PHQ QUESTIONS 1-9: 13
8. MOVING OR SPEAKING SO SLOWLY THAT OTHER PEOPLE COULD HAVE NOTICED. OR THE OPPOSITE - BEING SO FIDGETY OR RESTLESS THAT YOU HAVE BEEN MOVING AROUND A LOT MORE THAN USUAL: NOT AT ALL
SUM OF ALL RESPONSES TO PHQ9 QUESTIONS 1 & 2: 4
3. TROUBLE FALLING OR STAYING ASLEEP: SEVERAL DAYS
4. FEELING TIRED OR HAVING LITTLE ENERGY: NEARLY EVERY DAY
5. POOR APPETITE OR OVEREATING: NEARLY EVERY DAY
1. LITTLE INTEREST OR PLEASURE IN DOING THINGS: NEARLY EVERY DAY
9. THOUGHTS THAT YOU WOULD BE BETTER OFF DEAD, OR OF HURTING YOURSELF: NOT AT ALL
1. LITTLE INTEREST OR PLEASURE IN DOING THINGS: NEARLY EVERY DAY
10. IF YOU CHECKED OFF ANY PROBLEMS, HOW DIFFICULT HAVE THESE PROBLEMS MADE IT FOR YOU TO DO YOUR WORK, TAKE CARE OF THINGS AT HOME, OR GET ALONG WITH OTHER PEOPLE: VERY DIFFICULT
4. FEELING TIRED OR HAVING LITTLE ENERGY: NEARLY EVERY DAY
3. TROUBLE FALLING OR STAYING ASLEEP: SEVERAL DAYS
9. THOUGHTS THAT YOU WOULD BE BETTER OFF DEAD, OR OF HURTING YOURSELF: NOT AT ALL

## 2024-03-20 ENCOUNTER — OFFICE VISIT (OUTPATIENT)
Dept: FAMILY MEDICINE CLINIC | Age: 24
End: 2024-03-20

## 2024-03-20 VITALS
DIASTOLIC BLOOD PRESSURE: 84 MMHG | WEIGHT: 242.8 LBS | HEIGHT: 70 IN | SYSTOLIC BLOOD PRESSURE: 128 MMHG | BODY MASS INDEX: 34.76 KG/M2

## 2024-03-20 DIAGNOSIS — R53.83 FATIGUE, UNSPECIFIED TYPE: ICD-10-CM

## 2024-03-20 DIAGNOSIS — Z13.1 SCREENING FOR DIABETES MELLITUS: ICD-10-CM

## 2024-03-20 DIAGNOSIS — R68.82 DECREASED LIBIDO: ICD-10-CM

## 2024-03-20 DIAGNOSIS — Z87.81 HX OF FRACTURE: ICD-10-CM

## 2024-03-20 DIAGNOSIS — L72.9 BENIGN SKIN CYST: ICD-10-CM

## 2024-03-20 DIAGNOSIS — K21.9 GASTROESOPHAGEAL REFLUX DISEASE, UNSPECIFIED WHETHER ESOPHAGITIS PRESENT: ICD-10-CM

## 2024-03-20 DIAGNOSIS — F32.A ANXIETY AND DEPRESSION: Primary | ICD-10-CM

## 2024-03-20 DIAGNOSIS — Z86.711 HISTORY OF PULMONARY EMBOLISM: ICD-10-CM

## 2024-03-20 DIAGNOSIS — M54.2 MUSCLE PAIN, CERVICAL: ICD-10-CM

## 2024-03-20 DIAGNOSIS — E66.09 CLASS 1 OBESITY DUE TO EXCESS CALORIES WITHOUT SERIOUS COMORBIDITY WITH BODY MASS INDEX (BMI) OF 34.0 TO 34.9 IN ADULT: ICD-10-CM

## 2024-03-20 DIAGNOSIS — N52.9 ERECTILE DYSFUNCTION, UNSPECIFIED ERECTILE DYSFUNCTION TYPE: ICD-10-CM

## 2024-03-20 DIAGNOSIS — Z86.718 H/O DEEP VENOUS THROMBOSIS: ICD-10-CM

## 2024-03-20 DIAGNOSIS — F41.9 ANXIETY AND DEPRESSION: Primary | ICD-10-CM

## 2024-03-20 NOTE — PROGRESS NOTES
3/20/2024     Suleman Seals (:  2000) is a 23 y.o. male, here for evaluation of the following medical concerns:        MULTIPLE COMPLAINTS   Mom with him helping HPI    Right neck pain with right shoulder radiation. Muscular   Present four days without injury. Mild URI      ERECTILE DYSFUNCTION  Urology put him on cialis for ED - states its not working.   Did get labs but has not received results yet.   US scrotum scheduled for tomorrow.   Hx blood clot in testicle and microlithiasis         RECURRENT FRACTURES   Right foot with fall zip line 11yr old   Boxer fracture right hand 15yr old   Left foot x2 fracture 2023       No gallbladder. Removed       BLOOT CLOTS   PE and DVT left leg ---  with foot fracture. Was non weight bearing for weeks    Blood clot left testicle after lap pati oct 2020                  Anxiety depression   Had libido problem with medications so stopped taking   Has been on cymbalta prozac   STATES THE PROZAC SEEMED TO HAVE HELPED   Denies suicidal self harm thought plan idea  Wanting something to help   Declines therapy  Lack of motivation. Not wanting to leave house. Feeling down depressed hopeless. Anxious about symptoms being cancer. Mom with cancer current  Prev used alcohol to cope but has stopped   Does vape           Was drinking alcohol daily but has not had alcohol since dec 2023  Has lost weight due to stopping that   Nausea better since stopping alcohol   Reports nausea before brushing his teeth once per month              Smoker   Vaping and cigarettes.   He is not ready to quit.   Declines patches         Also breathes in welding fumes all day         He is wanting a DEXA bone scan     Reports low folate b12 and was taking supp years ago but not lately         Recurrent ingrowns thighs, groin                     Review of Systems    Prior to Visit Medications    Medication Sig Taking? Authorizing Provider   omeprazole (PRILOSEC) 20 MG delayed

## 2024-03-21 ENCOUNTER — TELEPHONE (OUTPATIENT)
Dept: FAMILY MEDICINE CLINIC | Age: 24
End: 2024-03-21

## 2024-03-21 ENCOUNTER — HOSPITAL ENCOUNTER (OUTPATIENT)
Dept: ULTRASOUND IMAGING | Age: 24
Discharge: HOME | End: 2024-03-21
Payer: COMMERCIAL

## 2024-03-21 DIAGNOSIS — N50.9 DISORDER OF MALE GENITAL ORGANS, UNSPECIFIED: ICD-10-CM

## 2024-03-21 PROCEDURE — 76870 US EXAM SCROTUM: CPT

## 2024-03-21 PROCEDURE — 93975 VASCULAR STUDY: CPT

## 2024-03-21 RX ORDER — OMEPRAZOLE 20 MG/1
20 CAPSULE, DELAYED RELEASE ORAL
Qty: 30 CAPSULE | Refills: 0 | Status: SHIPPED | OUTPATIENT
Start: 2024-03-21

## 2024-03-21 RX ORDER — FLUOXETINE HYDROCHLORIDE 20 MG/1
20 CAPSULE ORAL DAILY
Qty: 30 CAPSULE | Refills: 1 | Status: SHIPPED | OUTPATIENT
Start: 2024-03-21

## 2024-03-21 NOTE — TELEPHONE ENCOUNTER
Pts mom called today. She stated her son's pharmacy didn't receive a script for the Prilosec or Prozac discussed at visit yesterday. Pharmacy [facility] Gaitan Please advise

## 2024-03-23 ENCOUNTER — HOSPITAL ENCOUNTER (OUTPATIENT)
Age: 24
Discharge: HOME | End: 2024-03-23
Payer: COMMERCIAL

## 2024-03-23 DIAGNOSIS — T07.XXXA FRACTURES, MULTIPLE: ICD-10-CM

## 2024-03-23 DIAGNOSIS — Z86.718 H/O DEEP VENOUS THROMBOSIS: ICD-10-CM

## 2024-03-23 DIAGNOSIS — Z86.711 HISTORY OF PULMONARY EMBOLISM: ICD-10-CM

## 2024-03-23 DIAGNOSIS — E53.8 FOLATE DEFICIENCY: ICD-10-CM

## 2024-03-23 DIAGNOSIS — Z13.1 SCREENING FOR DIABETES MELLITUS: ICD-10-CM

## 2024-03-23 DIAGNOSIS — R53.83 FATIGUE, UNSPECIFIED TYPE: ICD-10-CM

## 2024-03-23 DIAGNOSIS — T07.XXXA MULTIPLE FRACTURES: ICD-10-CM

## 2024-03-23 LAB
25(OH)D3 SERPL-MCNC: 20.26 NG/ML
ANION GAP SERPL CALCULATED.3IONS-SCNC: 10 MMOL/L (ref 7–16)
BUN SERPL-MCNC: 11 MG/DL (ref 6–23)
CALCIUM SERPL-MCNC: 9.3 MG/DL (ref 8.3–10.6)
CHLORIDE BLD-SCNC: 104 MMOL/L (ref 99–110)
CO2: 27 MMOL/L (ref 21–32)
CREAT SERPL-MCNC: 1 MG/DL (ref 0.9–1.3)
GFR SERPL CREATININE-BSD FRML MDRD: >60 ML/MIN/1.73M2
GLUCOSE SERPL-MCNC: 95 MG/DL (ref 70–99)
POTASSIUM SERPL-SCNC: 4.6 MMOL/L (ref 3.5–5.1)
SODIUM BLD-SCNC: 141 MMOL/L (ref 135–145)
TSH SERPL DL<=0.005 MIU/L-ACNC: 0.77 UIU/ML (ref 0.27–4.2)
VITAMIN B-12: 411.5 PG/ML (ref 211–911)

## 2024-03-23 PROCEDURE — 80048 BASIC METABOLIC PNL TOTAL CA: CPT

## 2024-03-23 PROCEDURE — 82607 VITAMIN B-12: CPT

## 2024-03-23 PROCEDURE — 84443 ASSAY THYROID STIM HORMONE: CPT

## 2024-03-23 PROCEDURE — 82306 VITAMIN D 25 HYDROXY: CPT

## 2024-03-23 PROCEDURE — 36415 COLL VENOUS BLD VENIPUNCTURE: CPT

## 2024-03-23 PROCEDURE — 82746 ASSAY OF FOLIC ACID SERUM: CPT

## 2024-03-24 DIAGNOSIS — Z86.718 H/O DEEP VENOUS THROMBOSIS: ICD-10-CM

## 2024-03-24 DIAGNOSIS — E55.9 VITAMIN D DEFICIENCY: Primary | ICD-10-CM

## 2024-03-24 PROBLEM — N52.9 ERECTILE DYSFUNCTION: Status: ACTIVE | Noted: 2024-03-24

## 2024-03-25 ENCOUNTER — TELEPHONE (OUTPATIENT)
Dept: FAMILY MEDICINE CLINIC | Age: 24
End: 2024-03-25

## 2024-03-25 NOTE — TELEPHONE ENCOUNTER
----- Message from EVA Sandra NP sent at 3/24/2024  3:21 PM EDT -----  Please have pt follow up with heme for recurrent blood clots to go over his prev testing and decide if needing additional.

## 2024-03-26 ENCOUNTER — TELEPHONE (OUTPATIENT)
Dept: FAMILY MEDICINE CLINIC | Age: 24
End: 2024-03-26

## 2024-03-26 LAB
FOLATE SERPL-MCNC: 2 NG/ML (ref 3.1–17.5)
VITAMIN B-12: 411.5 PG/ML (ref 211–911)

## 2024-03-27 NOTE — TELEPHONE ENCOUNTER
We discussed at his appointment that it takes a few weeks for the medication to start working for his mental health.     If the vomiting is new since starting the new med, please schedule him to come see me asap. This week, tomorrow if he can. And hold the med.

## 2024-03-28 ENCOUNTER — OFFICE VISIT (OUTPATIENT)
Dept: FAMILY MEDICINE CLINIC | Age: 24
End: 2024-03-28
Payer: COMMERCIAL

## 2024-03-28 ENCOUNTER — PATIENT MESSAGE (OUTPATIENT)
Dept: FAMILY MEDICINE CLINIC | Age: 24
End: 2024-03-28

## 2024-03-28 VITALS
WEIGHT: 241.2 LBS | BODY MASS INDEX: 34.53 KG/M2 | SYSTOLIC BLOOD PRESSURE: 126 MMHG | OXYGEN SATURATION: 97 % | DIASTOLIC BLOOD PRESSURE: 80 MMHG | HEIGHT: 70 IN | HEART RATE: 75 BPM

## 2024-03-28 DIAGNOSIS — F41.9 ANXIETY AND DEPRESSION: Primary | ICD-10-CM

## 2024-03-28 DIAGNOSIS — F32.A ANXIETY AND DEPRESSION: Primary | ICD-10-CM

## 2024-03-28 DIAGNOSIS — R11.0 NAUSEA: ICD-10-CM

## 2024-03-28 PROCEDURE — G8417 CALC BMI ABV UP PARAM F/U: HCPCS | Performed by: NURSE PRACTITIONER

## 2024-03-28 PROCEDURE — G8484 FLU IMMUNIZE NO ADMIN: HCPCS | Performed by: NURSE PRACTITIONER

## 2024-03-28 PROCEDURE — 4004F PT TOBACCO SCREEN RCVD TLK: CPT | Performed by: NURSE PRACTITIONER

## 2024-03-28 PROCEDURE — 99214 OFFICE O/P EST MOD 30 MIN: CPT | Performed by: NURSE PRACTITIONER

## 2024-03-28 PROCEDURE — G8427 DOCREV CUR MEDS BY ELIG CLIN: HCPCS | Performed by: NURSE PRACTITIONER

## 2024-03-28 RX ORDER — ERGOCALCIFEROL 1.25 MG/1
50000 CAPSULE ORAL WEEKLY
Qty: 12 CAPSULE | Refills: 0 | Status: SHIPPED | OUTPATIENT
Start: 2024-03-28

## 2024-03-28 RX ORDER — TADALAFIL 5 MG/1
TABLET ORAL
COMMUNITY
Start: 2024-02-22

## 2024-03-28 RX ORDER — ONDANSETRON 4 MG/1
4 TABLET, ORALLY DISINTEGRATING ORAL 3 TIMES DAILY PRN
Qty: 21 TABLET | Refills: 0 | Status: SHIPPED | OUTPATIENT
Start: 2024-03-28

## 2024-03-28 RX ORDER — FAMOTIDINE 20 MG/1
20 TABLET, FILM COATED ORAL 2 TIMES DAILY PRN
Qty: 60 TABLET | Refills: 3 | Status: SHIPPED | OUTPATIENT
Start: 2024-03-28

## 2024-03-28 NOTE — PROGRESS NOTES
3/28/2024     Suleman Seals (:  2000) is a 23 y.o. male, here for evaluation of the following medical concerns:    Depression and nausea complaint     Started prozac 20mg dose 8 days ago on 3/20/24   He prev tolerated prozac.   States \" I feel slightly better. I dont feel as pressured as stressed out\"    Started protonix and pepcid 8 days ago for AM nausea and GERD.    night - drank gatorade in the middle of the night and immediately threw up   Monday night - woke up in the middle of the night and had one episode of emesis.   Took zofran Tuesday and Wednesday night and that helped.   No blood in emesis   Takes his prozac at 6am   \"Could be very possible gi bug\"  No fever sweating chills abdominal pain now   States he had woke up one of the nights drenched in sweat.  No symptoms the last two days.       States trouble regulating his temperature two weeks.   States he gets hot and sweats and then shivers .      Seeing urology today   Stable masslike lesion within the right epididymal head, presumably benign given the long-term stability.       Six days later, pt mother called office and left the following message.     Patient mother Lori Seals, \[facility]\"", called and wanted to inform the provider that patient is still not feeling any better.  Stated that she is concerned about he patient at this time.  Stated that patient has not been sleeping more than two hours. Stated that the patient is wound tight, not able to relax, and is not eating.  Stated that he has been vomiting, she gave the patient one of her Zofran to help with the nausea and vomiting, she stated that she knows that she should not be sharing her medication, but needs to try something to help with N/V. Wanted to know if provider could give the patient this medication so he can attempt to eat.  Mother is concerned about patient mental health, stated that he does not want to do anything or go be with his friends, that he would rather

## 2024-03-28 NOTE — TELEPHONE ENCOUNTER
From: Suleman Seals  To: Shy Juan  Sent: 3/28/2024 2:29 PM EDT  Subject: Testosterone levels    Dr. Cartwright said my testosterone levels look normal

## 2024-03-30 ENCOUNTER — HOSPITAL ENCOUNTER (OUTPATIENT)
Age: 24
Discharge: HOME | End: 2024-03-30
Payer: COMMERCIAL

## 2024-03-30 DIAGNOSIS — Z86.718 H/O DEEP VENOUS THROMBOSIS: ICD-10-CM

## 2024-03-30 LAB
25(OH)D3 SERPL-MCNC: 25.58 NG/ML
ALBUMIN SERPL-MCNC: 4.1 GM/DL (ref 3.4–5)
ALP BLD-CCNC: 105 IU/L (ref 40–129)
ALT SERPL-CCNC: 111 U/L (ref 10–40)
AST SERPL-CCNC: 79 IU/L (ref 15–37)
ATYPICAL LYMPHOCYTE ABSOLUTE COUNT: ABNORMAL
BILIRUB SERPL-MCNC: 0.5 MG/DL (ref 0–1)
BILIRUBIN DIRECT: 0.2 MG/DL (ref 0–0.3)
BILIRUBIN, INDIRECT: 0.3 MG/DL (ref 0–0.7)
DIFFERENTIAL TYPE: ABNORMAL
GONADOTROPIN, CHORIONIC (HCG) QUANT: <1 UIU/ML
HCT VFR BLD CALC: 42.8 % (ref 42–52)
HEMOGLOBIN: 14.2 GM/DL (ref 13.5–18)
LACTATE DEHYDROGENASE: 542 IU/L (ref 120–246)
LYMPHOCYTES ABSOLUTE: 8 K/CU MM
LYMPHOCYTES RELATIVE PERCENT: 67 % (ref 24–44)
MCH RBC QN AUTO: 30.6 PG (ref 27–31)
MCHC RBC AUTO-ENTMCNC: 33.2 % (ref 32–36)
MCV RBC AUTO: 92.2 FL (ref 78–100)
MONOCYTES ABSOLUTE: 1.2 K/CU MM
MONOCYTES RELATIVE PERCENT: 10 % (ref 0–4)
PDW BLD-RTO: 12 % (ref 11.7–14.9)
PLATELET # BLD: 179 K/CU MM (ref 140–440)
PLT MORPHOLOGY: ADEQUATE
PMV BLD AUTO: 10.9 FL (ref 7.5–11.1)
RBC # BLD: 4.64 M/CU MM (ref 4.6–6.2)
RBC # BLD: ABNORMAL 10*6/UL
SEGMENTED NEUTROPHILS ABSOLUTE COUNT: 2.8 K/CU MM
SEGMENTED NEUTROPHILS RELATIVE PERCENT: 23 % (ref 36–66)
TOTAL PROTEIN: 6.3 GM/DL (ref 6.4–8.2)
WBC # BLD: 12 K/CU MM (ref 4–10.5)

## 2024-03-30 PROCEDURE — 82306 VITAMIN D 25 HYDROXY: CPT

## 2024-03-30 PROCEDURE — 36415 COLL VENOUS BLD VENIPUNCTURE: CPT

## 2024-03-30 PROCEDURE — 85027 COMPLETE CBC AUTOMATED: CPT

## 2024-03-30 PROCEDURE — 80076 HEPATIC FUNCTION PANEL: CPT

## 2024-03-30 PROCEDURE — 84702 CHORIONIC GONADOTROPIN TEST: CPT

## 2024-03-30 PROCEDURE — 83615 LACTATE (LD) (LDH) ENZYME: CPT

## 2024-03-30 PROCEDURE — 85007 BL SMEAR W/DIFF WBC COUNT: CPT

## 2024-04-02 ENCOUNTER — TELEPHONE (OUTPATIENT)
Dept: FAMILY MEDICINE CLINIC | Age: 24
End: 2024-04-02

## 2024-04-02 LAB
AFP-TM SERPL-MCNC: 2 NG/ML (ref 0–9)
SMEAR REVIEW: NORMAL

## 2024-04-02 NOTE — TELEPHONE ENCOUNTER
Patient had labs and Ultra sound completed and would like the results of the test.  Stated that they had read them on My Chart but did not understand any of the information that was being stated .    Patient also requested a referral to be seen by .      Please advise.

## 2024-04-03 ENCOUNTER — TELEPHONE (OUTPATIENT)
Dept: FAMILY MEDICINE CLINIC | Age: 24
End: 2024-04-03

## 2024-04-03 DIAGNOSIS — R79.89 LFT ELEVATION: ICD-10-CM

## 2024-04-03 DIAGNOSIS — R74.02 ELEVATED SERUM LACTATE DEHYDROGENASE: ICD-10-CM

## 2024-04-03 DIAGNOSIS — D72.820 LYMPHOCYTOSIS: Primary | ICD-10-CM

## 2024-04-03 DIAGNOSIS — D72.820 ATYPICAL LYMPHOCYTOSIS: ICD-10-CM

## 2024-04-03 NOTE — TELEPHONE ENCOUNTER
Provider discussed labs and further workup with pt via phone. HIPAA verified.     High LFT -was fasting.no alcohol since dec 2023  Was taking tylenol 1000mg BID for one week but last dose 3/28/24 -Two days prior to labs   Hot flashes/ sweating in the mornings after getting ready for work still present   No further vomiting. Not needing zofran   Prozac helping.     CBC with atypical lymphocytes needing flow cytometry, high WBC and lymphs monocytes  Lactate Dehydrogenase HIGH - done by urology   AFP tumor marker NEG - done by urology   Vit d low - being replaced. Didn't need rechecked yet           Additional workup ordered today  Orders Placed This Encounter   Procedures    Flow Cytometry Leukemia/Lymphoma, Blood    CYTOMEGALOVIRUS ANTIBODY, IGG,IGM    SHIVA BARR VIRUS (EBV) ANTIBODY PANEL I    Comprehensive Metabolic Panel    Isidro Meza MD, Hematology Oncology, [place]         Stop tylenol.   Recheck CMP with lft fasting Saturday (pt works during the week)   Urgent ref to dr mora. Staff to call and get him scheduled. Can write work note for pt if needed    Incidental pregnancy test??  Advised to call urology and imaging center regarding.           All questions answered and pt denies further questions.

## 2024-04-03 NOTE — TELEPHONE ENCOUNTER
Received a call from [facility] Hemotology Department, Tiana, stated that they had ran the CBC for patient but needs to have a Flow Study completed due to the results came back Atypical. The sample was to old to run this test. Patient does not have to fast to have this test completed, but will need to go to [facility] or the imaging center to have completed.

## 2024-04-06 ENCOUNTER — HOSPITAL ENCOUNTER (OUTPATIENT)
Age: 24
Discharge: HOME | End: 2024-04-06
Payer: COMMERCIAL

## 2024-04-06 DIAGNOSIS — D72.820 ATYPICAL LYMPHOCYTOSIS: ICD-10-CM

## 2024-04-06 DIAGNOSIS — D72.820 LYMPHOCYTOSIS: ICD-10-CM

## 2024-04-06 DIAGNOSIS — R74.02 ELEVATED SERUM LACTATE DEHYDROGENASE: ICD-10-CM

## 2024-04-06 DIAGNOSIS — R79.89 LFT ELEVATION: ICD-10-CM

## 2024-04-06 LAB
ALBUMIN SERPL-MCNC: 4.4 GM/DL (ref 3.4–5)
ALP BLD-CCNC: 80 IU/L (ref 40–128)
ALT SERPL-CCNC: 66 U/L (ref 10–40)
ANION GAP SERPL CALCULATED.3IONS-SCNC: 10 MMOL/L (ref 7–16)
AST SERPL-CCNC: 57 IU/L (ref 15–37)
BILIRUB SERPL-MCNC: 0.6 MG/DL (ref 0–1)
BUN SERPL-MCNC: 9 MG/DL (ref 6–23)
CALCIUM SERPL-MCNC: 9.1 MG/DL (ref 8.3–10.6)
CHLORIDE BLD-SCNC: 106 MMOL/L (ref 99–110)
CO2: 24 MMOL/L (ref 21–32)
CREAT SERPL-MCNC: 0.7 MG/DL (ref 0.9–1.3)
GFR SERPL CREATININE-BSD FRML MDRD: >90 ML/MIN/1.73M2
GLUCOSE SERPL-MCNC: 93 MG/DL (ref 70–99)
POTASSIUM SERPL-SCNC: 4.5 MMOL/L (ref 3.5–5.1)
SODIUM BLD-SCNC: 140 MMOL/L (ref 135–145)
TOTAL PROTEIN: 6.6 GM/DL (ref 6.4–8.2)

## 2024-04-06 PROCEDURE — 86664 EPSTEIN-BARR NUCLEAR ANTIGEN: CPT

## 2024-04-06 PROCEDURE — 86645 CMV ANTIBODY IGM: CPT

## 2024-04-06 PROCEDURE — 86644 CMV ANTIBODY: CPT

## 2024-04-06 PROCEDURE — 80053 COMPREHEN METABOLIC PANEL: CPT

## 2024-04-06 PROCEDURE — 36415 COLL VENOUS BLD VENIPUNCTURE: CPT

## 2024-04-06 PROCEDURE — 86663 EPSTEIN-BARR ANTIBODY: CPT

## 2024-04-06 PROCEDURE — 86665 EPSTEIN-BARR CAPSID VCA: CPT

## 2024-04-10 ENCOUNTER — HOSPITAL ENCOUNTER (OUTPATIENT)
Age: 24
Discharge: HOME | End: 2024-04-10

## 2024-04-10 LAB
CMV IGG SERPL IA-ACNC: <0.2 U/ML
CMV IGM SERPL IA-ACNC: <8 AU/ML
EBV EA-D IGG SER-ACNC: 144 U/ML (ref 0–10.9)
EBV NA IGG SER IA-ACNC: <3 U/ML (ref 0–21.9)
EBV VCA IGG SER IA-ACNC: 92.5 U/ML (ref 0–21.9)
EBV VCA IGM SER IA-ACNC: >160 U/ML (ref 0–43.9)

## 2024-04-12 ENCOUNTER — HOSPITAL ENCOUNTER (OUTPATIENT)
Age: 24
Discharge: HOME | End: 2024-04-12
Payer: COMMERCIAL

## 2024-04-12 DIAGNOSIS — K21.9 GASTROESOPHAGEAL REFLUX DISEASE, UNSPECIFIED WHETHER ESOPHAGITIS PRESENT: ICD-10-CM

## 2024-04-12 PROCEDURE — 36415 COLL VENOUS BLD VENIPUNCTURE: CPT

## 2024-04-12 RX ORDER — OMEPRAZOLE 20 MG/1
20 CAPSULE, DELAYED RELEASE ORAL
Qty: 90 CAPSULE | Refills: 0 | Status: SHIPPED | OUTPATIENT
Start: 2024-04-12

## 2024-04-12 NOTE — TELEPHONE ENCOUNTER
Last appointment: 3/28/2024   Next Appointment: 5/6/2024     Allergies:  No Known Allergies      Recent Vitals:  Wt Readings from Last 3 Encounters:   03/28/24 109.4 kg (241 lb 3.2 oz)   03/20/24 110.1 kg (242 lb 12.8 oz)   12/07/23 118.4 kg (261 lb)     Ht Readings from Last 3 Encounters:   03/28/24 1.778 m (5' 10\")   03/20/24 1.778 m (5' 10\")   12/07/23 1.778 m (5' 10\")     BP Readings from Last 3 Encounters:   03/28/24 126/80   03/20/24 128/84   12/04/23 122/80     BMI Readings from Last 3 Encounters:   03/28/24 34.61 kg/m²   03/20/24 34.84 kg/m²   12/07/23 37.45 kg/m²       Recent Labs__________________________________________________________________________    Renal:   Creatinine   Date Value Ref Range Status   04/06/2024 0.7 (L) 0.9 - 1.3 MG/DL Final     BUN   Date Value Ref Range Status   04/06/2024 9 6 - 23 MG/DL Final     Sodium   Date Value Ref Range Status   04/06/2024 140 135 - 145 MMOL/L Final     Potassium   Date Value Ref Range Status   04/06/2024 4.5 3.5 - 5.1 MMOL/L Final     Chloride   Date Value Ref Range Status   04/06/2024 106 99 - 110 mMol/L Final     CO2   Date Value Ref Range Status   04/06/2024 24 21 - 32 MMOL/L Final       BMP:    Sodium   Date Value Ref Range Status   04/06/2024 140 135 - 145 MMOL/L Final     Potassium   Date Value Ref Range Status   04/06/2024 4.5 3.5 - 5.1 MMOL/L Final     Chloride   Date Value Ref Range Status   04/06/2024 106 99 - 110 mMol/L Final     CO2   Date Value Ref Range Status   04/06/2024 24 21 - 32 MMOL/L Final     BUN   Date Value Ref Range Status   04/06/2024 9 6 - 23 MG/DL Final     Creatinine   Date Value Ref Range Status   04/06/2024 0.7 (L) 0.9 - 1.3 MG/DL Final     Glucose   Date Value Ref Range Status   04/06/2024 93 70 - 99 MG/DL Final     Calcium   Date Value Ref Range Status   04/06/2024 9.1 8.3 - 10.6 MG/DL Final     Est, Glom Filt Rate   Date Value Ref Range Status   04/06/2024 >90 >60 mL/min/1.73m2 Final     Comment:             These results

## 2024-04-22 ENCOUNTER — HOSPITAL ENCOUNTER (OUTPATIENT)
Dept: INFUSION THERAPY | Age: 24
Discharge: HOME | End: 2024-04-22
Payer: COMMERCIAL

## 2024-04-22 ENCOUNTER — INITIAL CONSULT (OUTPATIENT)
Dept: ONCOLOGY | Age: 24
End: 2024-04-22
Payer: COMMERCIAL

## 2024-04-22 VITALS
HEART RATE: 69 BPM | TEMPERATURE: 98.8 F | BODY MASS INDEX: 32.98 KG/M2 | HEIGHT: 71 IN | SYSTOLIC BLOOD PRESSURE: 133 MMHG | DIASTOLIC BLOOD PRESSURE: 64 MMHG | WEIGHT: 235.6 LBS | OXYGEN SATURATION: 98 %

## 2024-04-22 DIAGNOSIS — D72.820 LYMPHOCYTOSIS: ICD-10-CM

## 2024-04-22 DIAGNOSIS — E53.8 FOLATE DEFICIENCY: Primary | ICD-10-CM

## 2024-04-22 DIAGNOSIS — E53.8 FOLATE DEFICIENCY: ICD-10-CM

## 2024-04-22 LAB
ALBUMIN SERPL-MCNC: 4.8 GM/DL (ref 3.4–5)
ALP BLD-CCNC: 65 IU/L (ref 40–128)
ALT SERPL-CCNC: 32 U/L (ref 10–40)
ANION GAP SERPL CALCULATED.3IONS-SCNC: 13 MMOL/L (ref 7–16)
AST SERPL-CCNC: 36 IU/L (ref 15–37)
BASOPHILS ABSOLUTE: 0 K/CU MM
BASOPHILS RELATIVE PERCENT: 0.3 % (ref 0–1)
BILIRUB SERPL-MCNC: 0.5 MG/DL (ref 0–1)
BUN SERPL-MCNC: 7 MG/DL (ref 6–23)
CALCIUM SERPL-MCNC: 9.1 MG/DL (ref 8.3–10.6)
CHLORIDE BLD-SCNC: 104 MMOL/L (ref 99–110)
CO2: 24 MMOL/L (ref 21–32)
CREAT SERPL-MCNC: 0.8 MG/DL (ref 0.9–1.3)
DIFFERENTIAL TYPE: ABNORMAL
EOSINOPHILS ABSOLUTE: 0.1 K/CU MM
EOSINOPHILS RELATIVE PERCENT: 2 % (ref 0–3)
GFR SERPL CREATININE-BSD FRML MDRD: >90 ML/MIN/1.73M2
GLUCOSE SERPL-MCNC: 69 MG/DL (ref 70–99)
HCT VFR BLD CALC: 41.8 % (ref 42–52)
HEMOGLOBIN: 14.7 GM/DL (ref 13.5–18)
LYMPHOCYTES ABSOLUTE: 2.3 K/CU MM
LYMPHOCYTES RELATIVE PERCENT: 37.7 % (ref 24–44)
MCH RBC QN AUTO: 31.1 PG (ref 27–31)
MCHC RBC AUTO-ENTMCNC: 35.2 % (ref 32–36)
MCV RBC AUTO: 88.6 FL (ref 78–100)
MONOCYTES ABSOLUTE: 0.6 K/CU MM
MONOCYTES RELATIVE PERCENT: 9.2 % (ref 0–4)
NEUTROPHILS RELATIVE PERCENT: 50.8 % (ref 36–66)
PDW BLD-RTO: 12.2 % (ref 11.7–14.9)
PLATELET # BLD: 218 K/CU MM (ref 140–440)
PMV BLD AUTO: 9.5 FL (ref 7.5–11.1)
POTASSIUM SERPL-SCNC: 4.1 MMOL/L (ref 3.5–5.1)
RBC # BLD: 4.72 M/CU MM (ref 4.6–6.2)
RETICULOCYTE COUNT PCT: 1.3 % (ref 0.2–2.2)
SEGMENTED NEUTROPHILS ABSOLUTE COUNT: 3.1 K/CU MM
SODIUM BLD-SCNC: 141 MMOL/L (ref 135–145)
TOTAL PROTEIN: 6.8 GM/DL (ref 6.4–8.2)
WBC # BLD: 6.1 K/CU MM (ref 4–10.5)

## 2024-04-22 PROCEDURE — 4004F PT TOBACCO SCREEN RCVD TLK: CPT | Performed by: INTERNAL MEDICINE

## 2024-04-22 PROCEDURE — G8417 CALC BMI ABV UP PARAM F/U: HCPCS | Performed by: INTERNAL MEDICINE

## 2024-04-22 PROCEDURE — 99204 OFFICE O/P NEW MOD 45 MIN: CPT | Performed by: INTERNAL MEDICINE

## 2024-04-22 PROCEDURE — 85025 COMPLETE CBC W/AUTO DIFF WBC: CPT

## 2024-04-22 PROCEDURE — 85045 AUTOMATED RETICULOCYTE COUNT: CPT

## 2024-04-22 PROCEDURE — G8427 DOCREV CUR MEDS BY ELIG CLIN: HCPCS | Performed by: INTERNAL MEDICINE

## 2024-04-22 PROCEDURE — 36415 COLL VENOUS BLD VENIPUNCTURE: CPT

## 2024-04-22 PROCEDURE — 83010 ASSAY OF HAPTOGLOBIN QUANT: CPT

## 2024-04-22 PROCEDURE — 80053 COMPREHEN METABOLIC PANEL: CPT

## 2024-04-22 PROCEDURE — 83615 LACTATE (LD) (LDH) ENZYME: CPT

## 2024-04-22 PROCEDURE — 99211 OFF/OP EST MAY X REQ PHY/QHP: CPT

## 2024-04-22 RX ORDER — FOLIC ACID 1 MG/1
1 TABLET ORAL DAILY
Qty: 30 TABLET | Refills: 5 | Status: SHIPPED | OUTPATIENT
Start: 2024-04-22

## 2024-04-22 NOTE — PROGRESS NOTES
MA Rooming Questions  Patient: Suleman Seals  MRN: [id number]    Date: 4/22/2024        New Patient        5. Did the patient have a depression screening completed today? No    No data recorded     PHQ-9 Given to (if applicable):               PHQ-9 Score (if applicable):                     [] Positive     []  Negative              Does question #9 need addressed (if applicable)                     [] Yes    []  No               Luz Maria Brannon MA  
Chondromalacia of right patellofemoral joint 01/12/2023    Chronic back pain     Depression     Displaced fracture of fifth metatarsal bone of left foot with routine healing 04/13/2023    ED (erectile dysfunction)     Erectile dysfunction     GERD (gastroesophageal reflux disease)     H/O deep venous thrombosis 03/21/2020    Headache     History of fracture of left ankle 03/21/2020    History of pulmonary embolism 03/21/2020    Hx of blood clots 10/22/2019    PE (after ankle fx)    Obesity     Sprain of right foot 05/25/2023      Past Surgery History:    Past Surgical History:   Procedure Laterality Date    CARPAL TUNNEL RELEASE Left 07/2020    CHOLECYSTECTOMY, LAPAROSCOPIC N/A 10/20/2020    CHOLECYSTECTOMY LAPAROSCOPIC performed by Ender Sanabria MD at [facility] OR    TONSILLECTOMY AND ADENOIDECTOMY  2011    UPPER GASTROINTESTINAL ENDOSCOPY N/A 9/1/2021    EGD BIOPSY performed by Ender Sanabria MD at [facility] ENDOSCOPY     Social History:   Social History     Socioeconomic History    Marital status: Single     Spouse name: None    Number of children: None    Years of education: None    Highest education level: None   Tobacco Use    Smoking status: Every Day     Current packs/day: 0.50     Average packs/day: 0.5 packs/day for 5.3 years (2.7 ttl pk-yrs)     Types: Cigarettes, E-Cigarettes     Start date: 1/1/2019    Smokeless tobacco: Never   Vaping Use    Vaping Use: Every day    Substances: Nicotine, Flavoring    Devices: Pre-filled or refillable cartridge, Pre-filled pod   Substance and Sexual Activity    Alcohol use: No    Drug use: Not Currently     Types: Marijuana (Weed)     Comment: Last used 8/2020    Sexual activity: Yes     Partners: Female   Social History Narrative    ** Merged History Encounter **          Social Determinants of Health     Financial Resource Strain: Medium Risk (3/19/2024)    Overall Financial Resource Strain (CARDIA)     Difficulty of Paying Living Expenses: Somewhat hard   Food Insecurity: No 
25.8

## 2024-04-24 LAB
HAPTOGLOB SERPL-MCNC: 11 MG/DL (ref 30–200)
LD ISOENZYME 2: 33 % (ref 29–42)
LDH SERPL L TO P-CCNC: 221 U/L (ref 105–230)
LDH1 CFR SERPL ELPH: 24 % (ref 14–27)
LDH3 CFR SERPL ELPH: 23 % (ref 18–30)
LDH4 CFR SERPL ELPH: 9 % (ref 8–15)
LDH5 CFR SERPL ELPH: 11 % (ref 6–23)

## 2024-04-29 ENCOUNTER — TELEPHONE (OUTPATIENT)
Dept: ONCOLOGY | Age: 24
End: 2024-04-29

## 2024-04-29 NOTE — TELEPHONE ENCOUNTER
Patient called given time and prep for US to be done on 5/1/24 [facility] arrival at 7:30 AM.

## 2024-05-01 ENCOUNTER — HOSPITAL ENCOUNTER (OUTPATIENT)
Dept: ULTRASOUND IMAGING | Age: 24
Discharge: HOME | End: 2024-05-01
Attending: INTERNAL MEDICINE
Payer: COMMERCIAL

## 2024-05-01 DIAGNOSIS — E53.8 FOLATE DEFICIENCY: ICD-10-CM

## 2024-05-01 DIAGNOSIS — D72.820 LYMPHOCYTOSIS: ICD-10-CM

## 2024-05-01 PROCEDURE — 76705 ECHO EXAM OF ABDOMEN: CPT

## 2024-05-06 ENCOUNTER — HOSPITAL ENCOUNTER (OUTPATIENT)
Age: 24
Discharge: HOME | End: 2024-05-06
Payer: COMMERCIAL

## 2024-05-06 ENCOUNTER — OFFICE VISIT (OUTPATIENT)
Dept: FAMILY MEDICINE CLINIC | Age: 24
End: 2024-05-06
Payer: COMMERCIAL

## 2024-05-06 VITALS
HEART RATE: 64 BPM | HEIGHT: 71 IN | WEIGHT: 235 LBS | SYSTOLIC BLOOD PRESSURE: 130 MMHG | OXYGEN SATURATION: 97 % | BODY MASS INDEX: 32.9 KG/M2 | DIASTOLIC BLOOD PRESSURE: 70 MMHG

## 2024-05-06 DIAGNOSIS — F41.9 ANXIETY AND DEPRESSION: Primary | ICD-10-CM

## 2024-05-06 DIAGNOSIS — B27.90 EBV INFECTION: ICD-10-CM

## 2024-05-06 DIAGNOSIS — F32.A ANXIETY AND DEPRESSION: Primary | ICD-10-CM

## 2024-05-06 DIAGNOSIS — M79.675 PAIN OF TOE OF LEFT FOOT: ICD-10-CM

## 2024-05-06 DIAGNOSIS — R16.1 SPLENOMEGALY: ICD-10-CM

## 2024-05-06 LAB — URATE SERPL-MCNC: 4.7 MG/DL (ref 3.5–7.2)

## 2024-05-06 PROCEDURE — G8427 DOCREV CUR MEDS BY ELIG CLIN: HCPCS | Performed by: NURSE PRACTITIONER

## 2024-05-06 PROCEDURE — 36415 COLL VENOUS BLD VENIPUNCTURE: CPT

## 2024-05-06 PROCEDURE — G8417 CALC BMI ABV UP PARAM F/U: HCPCS | Performed by: NURSE PRACTITIONER

## 2024-05-06 PROCEDURE — 84550 ASSAY OF BLOOD/URIC ACID: CPT

## 2024-05-06 PROCEDURE — 4004F PT TOBACCO SCREEN RCVD TLK: CPT | Performed by: NURSE PRACTITIONER

## 2024-05-06 PROCEDURE — 99214 OFFICE O/P EST MOD 30 MIN: CPT | Performed by: NURSE PRACTITIONER

## 2024-05-06 RX ORDER — FLUOXETINE HYDROCHLORIDE 40 MG/1
40 CAPSULE ORAL DAILY
Qty: 90 CAPSULE | Refills: 0 | Status: SHIPPED | OUTPATIENT
Start: 2024-05-06

## 2024-05-06 NOTE — PROGRESS NOTES
transcription may have occurred.    No follow-ups on file.    An electronic signature was used to authenticate this note.    --EVA Sandra - NP on 5/6/2024 at 4:32 PM

## 2024-06-09 PROBLEM — D72.820 LYMPHOCYTOSIS: Status: ACTIVE | Noted: 2024-06-09

## 2024-06-12 ENCOUNTER — HOSPITAL ENCOUNTER (OUTPATIENT)
Dept: INFUSION THERAPY | Age: 24
Discharge: HOME | End: 2024-06-12
Payer: COMMERCIAL

## 2024-06-12 ENCOUNTER — OFFICE VISIT (OUTPATIENT)
Dept: ONCOLOGY | Age: 24
End: 2024-06-12
Payer: COMMERCIAL

## 2024-06-12 VITALS
WEIGHT: 219.6 LBS | HEIGHT: 71 IN | DIASTOLIC BLOOD PRESSURE: 88 MMHG | TEMPERATURE: 98 F | SYSTOLIC BLOOD PRESSURE: 113 MMHG | RESPIRATION RATE: 16 BRPM | OXYGEN SATURATION: 96 % | BODY MASS INDEX: 30.74 KG/M2 | HEART RATE: 71 BPM

## 2024-06-12 DIAGNOSIS — E53.8 FOLATE DEFICIENCY: Primary | ICD-10-CM

## 2024-06-12 DIAGNOSIS — D72.820 LYMPHOCYTOSIS: ICD-10-CM

## 2024-06-12 PROCEDURE — 99211 OFF/OP EST MAY X REQ PHY/QHP: CPT

## 2024-06-12 PROCEDURE — G8427 DOCREV CUR MEDS BY ELIG CLIN: HCPCS | Performed by: INTERNAL MEDICINE

## 2024-06-12 PROCEDURE — G8417 CALC BMI ABV UP PARAM F/U: HCPCS | Performed by: INTERNAL MEDICINE

## 2024-06-12 PROCEDURE — 99213 OFFICE O/P EST LOW 20 MIN: CPT | Performed by: INTERNAL MEDICINE

## 2024-06-12 PROCEDURE — 4004F PT TOBACCO SCREEN RCVD TLK: CPT | Performed by: INTERNAL MEDICINE

## 2024-06-12 RX ORDER — ERGOCALCIFEROL 1.25 MG/1
50000 CAPSULE ORAL WEEKLY
Qty: 12 CAPSULE | Refills: 0 | OUTPATIENT
Start: 2024-06-12

## 2024-06-12 NOTE — PROGRESS NOTES
MA Rooming Questions  Patient: Suleman Seals  MRN: [id number]    Date: 6/12/2024        1. Do you have any new issues?   no         2. Do you need any refills on medications?    no    3. Have you had any imaging done since your last visit?   yes - U/S    4. Have you been hospitalized or seen in the emergency room since your last visit here?   no    5. Did the patient have a depression screening completed today? No    No data recorded     PHQ-9 Given to (if applicable):               PHQ-9 Score (if applicable):                     [] Positive     []  Negative              Does question #9 need addressed (if applicable)                     [] Yes    []  No               Mellissa Roman CMA      
lesions.  Neurologic:  No confusion, No seizures, No syncope, No tremor, No speech change, No headache, No hiccups, No abnormal gait, No sensory changes, No weakness.  Psychiatric:  No depression, No anxiety, Concentration normal.  Endocrine:  No polyuria, No polydipsia, No hot flashes, No thyroid symptoms.  Hematologic:  No epistaxis, No gingival bleeding, No petechiae, No ecchymosis.  Lymphatic:  No lymphadenopathy, No lymphedema.  Allergy / Immunologic:  No eczema, No frequent mucous infections, No frequent respiratory infections, No recurrent urticarial, No frequent skin infections.     Vital Signs: /88 (Site: Right Upper Arm, Position: Sitting, Cuff Size: Large Adult)   Pulse 71   Temp 98 °F (36.7 °C) (Temporal)   Resp 16   Ht 1.803 m (5' 10.98\")   Wt 99.6 kg (219 lb 9.6 oz)   SpO2 96%   BMI 30.64 kg/m²      Physical Exam:  CONSTITUTIONAL: awake, alert, cooperative, no apparent distress   EYES: pupils equal, round and reactive to light, sclera clear, normal conjunctiva  ENT: Normocephalic, without obvious abnormality, atraumatic  NECK: supple, symmetrical, no jugular venous distension, no carotid bruits   HEMATOLOGIC/LYMPHATIC: no cervical, supraclavicular or axillary lymphadenopathy   LUNGS: VBS, no wheezes, no increased work of breathing, no rhonchi, clear to auscultation, no crackles,    CARDIOVASCULAR: regular rate and rhythm, normal S1 and S2, no murmur noted  ABDOMEN: normal bowel sounds x 4, soft, non-distended, non-tender, no masses palpated, no hepatosplenomegaly   MUSCULOSKELETAL: full range of motion noted, tone is normal  NEUROLOGIC: awake, alert, oriented to name, place and time. Motor skills grossly intact.   SKIN: appears intact, normal skin color, normal texture, normal turgor, no jaundice.   EXTREMITIES: no LE edema, no leg swelling, no cyanosis, no clubbing,       Labs:  Hematology:  Lab Results   Component Value Date    WBC 6.1 04/22/2024    RBC 4.72 04/22/2024    HGB 14.7

## 2024-06-12 NOTE — TELEPHONE ENCOUNTER
extra-renal   metabolism of creatine, excessive creatine ingestion, or following therapy that affects   renal tubular secretion.       GFR    Date Value Ref Range Status   01/14/2022 >60 >60 Final     Comment:     Chronic Kidney Disease: less than 60 ml/min/1.73 sq.m.          Kidney Failure: less than 15 ml/min/1.73 sq.m.  Results valid for patients 18 years and older.       Albumin/Globulin Ratio   Date Value Ref Range Status   01/14/2022 2.1 1.1 - 2.2 Final       Lipids:    No results found for: \"CHOL\", \"TRIG\", \"HDL\", \"VLDL\", \"CHOLHDLRATIO\"    A1C:    Hemoglobin A1C   Date Value Ref Range Status   01/14/2022 4.9 See comment % Final     Comment:     Comment:  Diagnosis of Diabetes: > or = 6.5%  Increased risk of diabetes (Prediabetes): 5.7-6.4%  Glycemic Control: Nonpregnant Adults: <7.0%                    Pregnant: <6.0%           TSH:    TSH   Date Value Ref Range Status   01/14/2022 0.75 0.27 - 4.20 uIU/mL Final     TSH, High Sensitivity   Date Value Ref Range Status   03/23/2024 0.771 0.270 - 4.20 uIu/ml Final     Comment:             Patients with high levels of Biotin intake (ie >5mg/day) may have falsely decreased TSHS   levels.  Samples collected within 8 hours of Biotin intake may require additional information for   diagnosis.         UA:  Color, UA   Date Value Ref Range Status   10/25/2020 YELLOW YELLOW Final     Clarity, UA   Date Value Ref Range Status   10/25/2020 CLEAR CLEAR Final     Bilirubin Urine   Date Value Ref Range Status   10/25/2020 NEGATIVE NEGATIVE MG/DL Final     Ketones, Urine   Date Value Ref Range Status   10/25/2020 NEGATIVE NEGATIVE MG/DL Final     Blood, Urine   Date Value Ref Range Status   10/25/2020 NEGATIVE NEGATIVE Final     Protein, UA   Date Value Ref Range Status   10/25/2020 NEGATIVE NEGATIVE MG/DL Final     Urobilinogen, Urine   Date Value Ref Range Status   10/25/2020 NORMAL 0.2 - 1.0 MG/DL Final     Nitrite Urine, Quantitative   Date Value Ref Range

## 2024-06-21 LAB — COMMENT: NORMAL

## 2024-07-01 ENCOUNTER — HOSPITAL ENCOUNTER (OUTPATIENT)
Age: 24
Discharge: HOME | End: 2024-07-01
Payer: COMMERCIAL

## 2024-07-01 ENCOUNTER — OFFICE VISIT (OUTPATIENT)
Dept: FAMILY MEDICINE CLINIC | Age: 24
End: 2024-07-01
Payer: COMMERCIAL

## 2024-07-01 VITALS
DIASTOLIC BLOOD PRESSURE: 74 MMHG | SYSTOLIC BLOOD PRESSURE: 126 MMHG | BODY MASS INDEX: 30.94 KG/M2 | WEIGHT: 221 LBS | OXYGEN SATURATION: 97 % | HEIGHT: 71 IN | HEART RATE: 58 BPM

## 2024-07-01 DIAGNOSIS — E55.9 VITAMIN D DEFICIENCY: ICD-10-CM

## 2024-07-01 DIAGNOSIS — F32.A ANXIETY AND DEPRESSION: ICD-10-CM

## 2024-07-01 DIAGNOSIS — F41.9 ANXIETY AND DEPRESSION: ICD-10-CM

## 2024-07-01 DIAGNOSIS — E55.9 VITAMIN D DEFICIENCY: Primary | ICD-10-CM

## 2024-07-01 LAB — 25(OH)D3 SERPL-MCNC: 59.08 NG/ML

## 2024-07-01 PROCEDURE — 36415 COLL VENOUS BLD VENIPUNCTURE: CPT

## 2024-07-01 PROCEDURE — 82306 VITAMIN D 25 HYDROXY: CPT

## 2024-07-01 PROCEDURE — 4004F PT TOBACCO SCREEN RCVD TLK: CPT | Performed by: NURSE PRACTITIONER

## 2024-07-01 PROCEDURE — 99214 OFFICE O/P EST MOD 30 MIN: CPT | Performed by: NURSE PRACTITIONER

## 2024-07-01 PROCEDURE — G8427 DOCREV CUR MEDS BY ELIG CLIN: HCPCS | Performed by: NURSE PRACTITIONER

## 2024-07-01 PROCEDURE — G8417 CALC BMI ABV UP PARAM F/U: HCPCS | Performed by: NURSE PRACTITIONER

## 2024-07-01 RX ORDER — FLUOXETINE HYDROCHLORIDE 40 MG/1
40 CAPSULE ORAL DAILY
Qty: 90 CAPSULE | Refills: 1 | Status: SHIPPED | OUTPATIENT
Start: 2024-07-01

## 2024-07-01 NOTE — PROGRESS NOTES
2024     Suleman Seals (:  2000) is a 23 y.o. male, here for evaluation of the following medical concerns:        Follow up on moods   Prozac 40   Doing well   Denies side effects   Denies suicidal self harm thought plan idea   Wants to leave medication the same    EBV mono early .  Following with hematology.  Working a lot, 57 hours this last week.  Reports some fatigue but better since replacing vitamin D.  Replaced with high-dose 2024 and is taking a daily multivitamin now and folic acid B12 which was also low previously.                Review of Systems    Prior to Visit Medications    Medication Sig Taking? Authorizing Provider   FLUoxetine (PROZAC) 40 MG capsule Take 1 capsule by mouth daily Yes Shy Juan APRN - NP   Multiple Vitamin (MULTI VITAMIN DAILY PO) Take by mouth Yes ProviderElgin MD   folic acid (FOLVITE) 1 MG tablet Take 1 tablet by mouth daily Yes Isidro Soler MD   omeprazole (PRILOSEC) 20 MG delayed release capsule Take 1 capsule by mouth every morning (before breakfast) Yes Shy Juan APRN - NP   tadalafil (CIALIS) 5 MG tablet 2 tablets Yes ProviderElgin MD   famotidine (PEPCID) 20 MG tablet Take 1 tablet by mouth 2 times daily as needed (indigestion nausea) Yes Shy Juan APRN - NP   ondansetron (ZOFRAN-ODT) 4 MG disintegrating tablet Take 1 tablet by mouth 3 times daily as needed for Nausea or Vomiting Yes Shy Juan, APRN - NP        Social History     Tobacco Use    Smoking status: Every Day     Current packs/day: 0.50     Average packs/day: 0.5 packs/day for 5.5 years (2.7 ttl pk-yrs)     Types: Cigarettes, E-Cigarettes     Start date: 2019    Smokeless tobacco: Never   Substance Use Topics    Alcohol use: No        Vitals:    24 1543   BP: 126/74   Site: Left Upper Arm   Position: Sitting   Cuff Size: Large Adult   Pulse: 58   SpO2: 97%   Weight: 100.2 kg (221 lb)   Height: 1.803 m (5' 10.98\")     Estimated

## 2024-07-19 DIAGNOSIS — K21.9 GASTROESOPHAGEAL REFLUX DISEASE, UNSPECIFIED WHETHER ESOPHAGITIS PRESENT: ICD-10-CM

## 2024-07-19 RX ORDER — OMEPRAZOLE 20 MG/1
20 CAPSULE, DELAYED RELEASE ORAL
Qty: 90 CAPSULE | Refills: 0 | Status: SHIPPED | OUTPATIENT
Start: 2024-07-19

## 2024-07-19 RX ORDER — ERGOCALCIFEROL 1.25 MG/1
50000 CAPSULE ORAL WEEKLY
Qty: 12 CAPSULE | Refills: 0 | OUTPATIENT
Start: 2024-07-19

## 2024-07-22 RX ORDER — ERGOCALCIFEROL 1.25 MG/1
50000 CAPSULE ORAL WEEKLY
Qty: 12 CAPSULE | Refills: 0 | OUTPATIENT
Start: 2024-07-22

## 2024-08-14 RX ORDER — FOLIC ACID 1 MG/1
1000 TABLET ORAL DAILY
Qty: 90 TABLET | Refills: 1 | Status: SHIPPED | OUTPATIENT
Start: 2024-08-14

## 2024-08-26 ENCOUNTER — OFFICE VISIT (OUTPATIENT)
Dept: FAMILY MEDICINE CLINIC | Age: 24
End: 2024-08-26
Payer: COMMERCIAL

## 2024-08-26 VITALS
HEART RATE: 83 BPM | HEIGHT: 71 IN | DIASTOLIC BLOOD PRESSURE: 80 MMHG | SYSTOLIC BLOOD PRESSURE: 124 MMHG | BODY MASS INDEX: 31.92 KG/M2 | WEIGHT: 228 LBS

## 2024-08-26 DIAGNOSIS — N50.89 EPIDIDYMAL MASS: ICD-10-CM

## 2024-08-26 DIAGNOSIS — E55.9 VITAMIN D DEFICIENCY: ICD-10-CM

## 2024-08-26 DIAGNOSIS — Z00.00 ANNUAL PHYSICAL EXAM: Primary | ICD-10-CM

## 2024-08-26 DIAGNOSIS — F17.200 SMOKER: ICD-10-CM

## 2024-08-26 DIAGNOSIS — K21.9 GASTROESOPHAGEAL REFLUX DISEASE, UNSPECIFIED WHETHER ESOPHAGITIS PRESENT: ICD-10-CM

## 2024-08-26 DIAGNOSIS — R16.1 SPLENOMEGALY: ICD-10-CM

## 2024-08-26 DIAGNOSIS — F41.9 ANXIETY AND DEPRESSION: ICD-10-CM

## 2024-08-26 DIAGNOSIS — F32.A ANXIETY AND DEPRESSION: ICD-10-CM

## 2024-08-26 PROCEDURE — 99395 PREV VISIT EST AGE 18-39: CPT | Performed by: NURSE PRACTITIONER

## 2024-08-26 RX ORDER — FAMOTIDINE 20 MG/1
20 TABLET, FILM COATED ORAL 2 TIMES DAILY PRN
Qty: 60 TABLET | Refills: 3 | Status: SHIPPED | OUTPATIENT
Start: 2024-08-26

## 2024-08-26 RX ORDER — FLUOXETINE 40 MG/1
40 CAPSULE ORAL DAILY
Qty: 90 CAPSULE | Refills: 1 | Status: CANCELLED | OUTPATIENT
Start: 2024-08-26

## 2024-08-26 RX ORDER — ONDANSETRON 4 MG/1
4 TABLET, ORALLY DISINTEGRATING ORAL 3 TIMES DAILY PRN
Qty: 21 TABLET | Refills: 0 | Status: CANCELLED | OUTPATIENT
Start: 2024-08-26

## 2024-08-26 NOTE — PROGRESS NOTES
2024     Suleman Seals (:  2000) is a 23 y.o. male, here for evaluation of the following medical concerns:        Annual physical       Anxiety depression   Prozac 40   Doing well   Denies side effects   Denies suicidal self harm thought plan idea   Wants to leave medication the same         EBV mono early .  Following with hematology.  Working a lot      Vit d def - replaced. 59  2024   On multivit daily now        No alcohol since dec 2023      Vaping and smoking nicotine   Not ready to quit,.     Stable mass like lesion within right epididymal head - AFP and akiko-HCG were normal. Agree with urology and recommend him to have repeat US scrotum in 1 year. 2025  On cialis with urology team     Splenomegaly with mono   heme wants US spleen repeated 2024.   Heme follow up and labs scheduled dec 2024            Review of Systems    Prior to Visit Medications    Medication Sig Taking? Authorizing Provider   omeprazole (PRILOSEC) 20 MG delayed release capsule Take 1 capsule by mouth every morning (before breakfast) Yes Shy Juan, APRN - NP   famotidine (PEPCID) 20 MG tablet Take 1 tablet by mouth 2 times daily as needed (indigestion nausea) Yes Shy Juan, APRN - NP   folic acid (FOLVITE) 1 MG tablet TAKE 1 TABLET BY MOUTH EVERY DAY Yes Isidro Soler MD   FLUoxetine (PROZAC) 40 MG capsule Take 1 capsule by mouth daily Yes Shy Juan, APRN - NP   Multiple Vitamin (MULTI VITAMIN DAILY PO) Take by mouth Yes ProviderElgin MD   tadalafil (CIALIS) 5 MG tablet 2 tablets Yes Provider, MD Elgin   ondansetron (ZOFRAN-ODT) 4 MG disintegrating tablet Take 1 tablet by mouth 3 times daily as needed for Nausea or Vomiting Yes Shy Juan, APRN - NP        Social History     Tobacco Use    Smoking status: Every Day     Current packs/day: 0.50     Average packs/day: 0.5 packs/day for 5.7 years (2.8 ttl pk-yrs)     Types: Cigarettes, E-Cigarettes     Start date:  by mouth every morning (before breakfast), Disp-90 capsule, R-0Normal  3. Vitamin D deficiency  4. Splenomegaly  5. Anxiety and depression  6. Smoker  7. Epididymal mass      Continue multivitamin.  Continue all other current medications including Prozac 40.  Follow-up 6 months chronic's.  Labs with hematology team December 2024  Ultrasound spleen November 2024.  Phone number given to call and schedule  Ultrasound scrotum March 2025 per urology team.        All care gaps addressed     All questions answered    Discussed use, benefit, and side effects of prescribed medications.  Barriers to compliance discussed.  All patient questions answered.  Pt voiced understanding.     Present to the ER for any emergent or acute symptoms not managed at home or in office.    Please note that this chart was generated using dragon dictation software.  Although every effort was made to ensure the accuracy of this automated transcription, some errors in transcription may have occurred.    No follow-ups on file.    An electronic signature was used to authenticate this note.    --EVA Sandra - NP on 8/26/2024 at 4:50 PM

## 2024-10-03 ENCOUNTER — OFFICE VISIT (OUTPATIENT)
Dept: FAMILY MEDICINE CLINIC | Age: 24
End: 2024-10-03

## 2024-10-03 VITALS
SYSTOLIC BLOOD PRESSURE: 122 MMHG | BODY MASS INDEX: 31.5 KG/M2 | HEIGHT: 71 IN | OXYGEN SATURATION: 98 % | TEMPERATURE: 98 F | HEART RATE: 65 BPM | DIASTOLIC BLOOD PRESSURE: 74 MMHG | WEIGHT: 225 LBS

## 2024-10-03 DIAGNOSIS — J06.9 URI, ACUTE: Primary | ICD-10-CM

## 2024-10-03 RX ORDER — CETIRIZINE HYDROCHLORIDE 10 MG/1
10 TABLET ORAL DAILY
Qty: 90 TABLET | Refills: 0 | Status: SHIPPED | OUTPATIENT
Start: 2024-10-03

## 2024-10-03 RX ORDER — FLUTICASONE PROPIONATE 50 MCG
2 SPRAY, SUSPENSION (ML) NASAL DAILY
Qty: 48 G | Refills: 1 | Status: SHIPPED | OUTPATIENT
Start: 2024-10-03

## 2024-10-03 RX ORDER — METHYLPREDNISOLONE 4 MG
TABLET, DOSE PACK ORAL
Qty: 1 KIT | Refills: 0 | Status: SHIPPED | OUTPATIENT
Start: 2024-10-03

## 2024-10-03 NOTE — PROGRESS NOTES
10/3/2024     Suleman Seals (:  2000) is a 24 y.o. male, here for evaluation of the following medical concerns:    Cough, sore throat, nasal rhinorrhea and congestion, fatigue for 6 days. Not getting better. OTC products not helping.       Review of Systems    Prior to Visit Medications    Medication Sig Taking? Authorizing Provider   fluticasone (FLONASE) 50 MCG/ACT nasal spray 2 sprays by Each Nostril route daily Yes Shy Juan APRN - NP   cetirizine (ZYRTEC) 10 MG tablet Take 1 tablet by mouth daily Yes Shy Juan APRN - NP   amoxicillin-clavulanate (AUGMENTIN) 875-125 MG per tablet Take 1 tablet by mouth 2 times daily for 10 days Yes Shy Juan APRN - NP   methylPREDNISolone (MEDROL DOSEPACK) 4 MG tablet Take by mouth. Yes Shy Juan APRN - NP   omeprazole (PRILOSEC) 20 MG delayed release capsule Take 1 capsule by mouth every morning (before breakfast) Yes Shy Juan APRN - NP   famotidine (PEPCID) 20 MG tablet Take 1 tablet by mouth 2 times daily as needed (indigestion nausea) Yes Shy Juan APRN - NP   folic acid (FOLVITE) 1 MG tablet TAKE 1 TABLET BY MOUTH EVERY DAY Yes Isidro Soler MD   FLUoxetine (PROZAC) 40 MG capsule Take 1 capsule by mouth daily Yes Shy Juan, APRN - NP   Multiple Vitamin (MULTI VITAMIN DAILY PO) Take by mouth Yes ProviderElgin MD   tadalafil (CIALIS) 5 MG tablet 2 tablets Yes ProviderElgin MD   ondansetron (ZOFRAN-ODT) 4 MG disintegrating tablet Take 1 tablet by mouth 3 times daily as needed for Nausea or Vomiting Yes Shy Juan, APRN - NP        Social History     Tobacco Use    Smoking status: Every Day     Current packs/day: 0.50     Average packs/day: 0.5 packs/day for 5.8 years (2.9 ttl pk-yrs)     Types: Cigarettes, E-Cigarettes     Start date: 2019    Smokeless tobacco: Never   Substance Use Topics    Alcohol use: No        Vitals:    10/03/24 1513   BP: 122/74   Site: Left Upper Arm   Position: Sitting

## 2024-11-07 ENCOUNTER — TELEPHONE (OUTPATIENT)
Dept: ONCOLOGY | Age: 24
End: 2024-11-07

## 2024-11-07 NOTE — TELEPHONE ENCOUNTER
Spoke to patient regarding US ABD scheduled for 12/5 with 0900 arrival time at [facility] facility. NPO 8 hrs prior. Patient voiced understanding.

## 2025-02-10 RX ORDER — FLUOXETINE 40 MG/1
CAPSULE ORAL DAILY
Qty: 90 CAPSULE | Refills: 1 | Status: SHIPPED | OUTPATIENT
Start: 2025-02-10

## 2025-05-08 ENCOUNTER — TELEPHONE (OUTPATIENT)
Dept: FAMILY MEDICINE CLINIC | Age: 25
End: 2025-05-08

## 2025-05-12 ENCOUNTER — OFFICE VISIT (OUTPATIENT)
Dept: FAMILY MEDICINE CLINIC | Age: 25
End: 2025-05-12
Payer: COMMERCIAL

## 2025-05-12 VITALS
WEIGHT: 255 LBS | OXYGEN SATURATION: 96 % | BODY MASS INDEX: 35.59 KG/M2 | HEART RATE: 84 BPM | DIASTOLIC BLOOD PRESSURE: 76 MMHG | SYSTOLIC BLOOD PRESSURE: 122 MMHG

## 2025-05-12 DIAGNOSIS — F32.A ANXIETY AND DEPRESSION: Primary | ICD-10-CM

## 2025-05-12 DIAGNOSIS — Z13.1 SCREENING FOR DIABETES MELLITUS: ICD-10-CM

## 2025-05-12 DIAGNOSIS — E55.9 VITAMIN D DEFICIENCY: ICD-10-CM

## 2025-05-12 DIAGNOSIS — F41.9 ANXIETY AND DEPRESSION: Primary | ICD-10-CM

## 2025-05-12 PROCEDURE — 99214 OFFICE O/P EST MOD 30 MIN: CPT | Performed by: NURSE PRACTITIONER

## 2025-05-12 RX ORDER — FLUOXETINE HYDROCHLORIDE 40 MG/1
CAPSULE ORAL DAILY
Qty: 90 CAPSULE | Refills: 1 | Status: CANCELLED | OUTPATIENT
Start: 2025-05-12

## 2025-05-12 NOTE — PROGRESS NOTES
2025     Suleman Seals (:  2000) is a 24 y.o. male, here for evaluation of the following medical concerns:    History of Present Illness  The patient presents for a follow-up on his mood.    Anxiety and Depression  - Currently on Prozac 40 mg for management  - Reports inconsistent adherence to medication regimen  - Instances of missed doses and taken doses without noticeable effect  - Describes experience as fluctuating with good days even without medication and bad days despite taking it misses the wknd doses sometimes   - Expresses desire to increase Prozac dosage to 60 mg  - Does not endorse any suicidal ideation or self-harm tendencies    Supplemental information: He is also supplementing with vitamin D and a multivitamin. There is a concern that his vitamin D levels might be low, potentially contributing to his symptoms.               Prior to Visit Medications    Medication Sig Taking? Authorizing Provider   FLUoxetine (PROZAC) 20 MG capsule Take 3 capsules by mouth daily Yes Shy Juan, EVA - NP   folic acid (FOLVITE) 1 MG tablet TAKE 1 TABLET BY MOUTH EVERY DAY Yes Isidro Soler MD   Multiple Vitamin (MULTI VITAMIN DAILY PO) Take by mouth Yes ProviderElgin MD            Social History     Tobacco Use    Smoking status: Every Day     Current packs/day: 0.50     Average packs/day: 0.5 packs/day for 6.4 years (3.2 ttl pk-yrs)     Types: Cigarettes, E-Cigarettes     Start date: 2019    Smokeless tobacco: Never   Substance Use Topics    Alcohol use: No            Vitals:    25 1602   BP: 122/76   BP Site: Left Upper Arm   Patient Position: Sitting   BP Cuff Size: Medium Adult   Pulse: 84   SpO2: 96%   Weight: 115.7 kg (255 lb)         Estimated body mass index is 35.59 kg/m² as calculated from the following:    Height as of 10/3/24: 1.803 m (5' 10.98\").    Weight as of this encounter: 115.7 kg (255 lb).      Physical Exam  Constitutional:       General: He is not in

## 2025-06-04 ENCOUNTER — OFFICE VISIT (OUTPATIENT)
Dept: FAMILY MEDICINE CLINIC | Age: 25
End: 2025-06-04
Payer: COMMERCIAL

## 2025-06-04 VITALS
OXYGEN SATURATION: 95 % | HEART RATE: 86 BPM | DIASTOLIC BLOOD PRESSURE: 76 MMHG | BODY MASS INDEX: 35.31 KG/M2 | WEIGHT: 253 LBS | SYSTOLIC BLOOD PRESSURE: 138 MMHG | TEMPERATURE: 98 F

## 2025-06-04 DIAGNOSIS — J06.9 URI, ACUTE: Primary | ICD-10-CM

## 2025-06-04 PROCEDURE — 99213 OFFICE O/P EST LOW 20 MIN: CPT | Performed by: NURSE PRACTITIONER

## 2025-06-04 RX ORDER — METHYLPREDNISOLONE 4 MG/1
TABLET ORAL
Qty: 1 KIT | Refills: 0 | Status: SHIPPED | OUTPATIENT
Start: 2025-06-04

## 2025-06-04 RX ORDER — FLUTICASONE PROPIONATE 50 MCG
1 SPRAY, SUSPENSION (ML) NASAL DAILY
Qty: 32 G | Refills: 1 | Status: SHIPPED | OUTPATIENT
Start: 2025-06-04

## 2025-06-04 RX ORDER — GUAIFENESIN 600 MG/1
600 TABLET, EXTENDED RELEASE ORAL 2 TIMES DAILY
Qty: 28 TABLET | Refills: 0 | Status: SHIPPED | OUTPATIENT
Start: 2025-06-04 | End: 2025-06-18

## 2025-06-04 RX ORDER — ALBUTEROL SULFATE 90 UG/1
2 INHALANT RESPIRATORY (INHALATION) EVERY 6 HOURS PRN
COMMUNITY
Start: 2025-05-19

## 2025-06-04 RX ORDER — INHALER, ASSIST DEVICES
SPACER (EA) MISCELLANEOUS
COMMUNITY
Start: 2025-05-19

## 2025-06-04 RX ORDER — AZITHROMYCIN 250 MG/1
250 TABLET, FILM COATED ORAL SEE ADMIN INSTRUCTIONS
Qty: 6 TABLET | Refills: 0 | Status: SHIPPED | OUTPATIENT
Start: 2025-06-04 | End: 2025-06-09

## 2025-06-04 SDOH — ECONOMIC STABILITY: FOOD INSECURITY: WITHIN THE PAST 12 MONTHS, THE FOOD YOU BOUGHT JUST DIDN'T LAST AND YOU DIDN'T HAVE MONEY TO GET MORE.: NEVER TRUE

## 2025-06-04 SDOH — ECONOMIC STABILITY: FOOD INSECURITY: WITHIN THE PAST 12 MONTHS, YOU WORRIED THAT YOUR FOOD WOULD RUN OUT BEFORE YOU GOT MONEY TO BUY MORE.: NEVER TRUE

## 2025-06-04 ASSESSMENT — PATIENT HEALTH QUESTIONNAIRE - PHQ9
5. POOR APPETITE OR OVEREATING: SEVERAL DAYS
SUM OF ALL RESPONSES TO PHQ QUESTIONS 1-9: 4
7. TROUBLE CONCENTRATING ON THINGS, SUCH AS READING THE NEWSPAPER OR WATCHING TELEVISION: SEVERAL DAYS
SUM OF ALL RESPONSES TO PHQ QUESTIONS 1-9: 4
9. THOUGHTS THAT YOU WOULD BE BETTER OFF DEAD, OR OF HURTING YOURSELF: NOT AT ALL
10. IF YOU CHECKED OFF ANY PROBLEMS, HOW DIFFICULT HAVE THESE PROBLEMS MADE IT FOR YOU TO DO YOUR WORK, TAKE CARE OF THINGS AT HOME, OR GET ALONG WITH OTHER PEOPLE: NOT DIFFICULT AT ALL
SUM OF ALL RESPONSES TO PHQ QUESTIONS 1-9: 4
3. TROUBLE FALLING OR STAYING ASLEEP: SEVERAL DAYS
4. FEELING TIRED OR HAVING LITTLE ENERGY: SEVERAL DAYS
SUM OF ALL RESPONSES TO PHQ QUESTIONS 1-9: 4
1. LITTLE INTEREST OR PLEASURE IN DOING THINGS: NOT AT ALL
6. FEELING BAD ABOUT YOURSELF - OR THAT YOU ARE A FAILURE OR HAVE LET YOURSELF OR YOUR FAMILY DOWN: NOT AT ALL
2. FEELING DOWN, DEPRESSED OR HOPELESS: NOT AT ALL
8. MOVING OR SPEAKING SO SLOWLY THAT OTHER PEOPLE COULD HAVE NOTICED. OR THE OPPOSITE, BEING SO FIGETY OR RESTLESS THAT YOU HAVE BEEN MOVING AROUND A LOT MORE THAN USUAL: NOT AT ALL

## 2025-06-04 NOTE — PROGRESS NOTES
2025     Suleman Seals (:  2000) is a 24 y.o. male, here for evaluation of the following medical concerns:    History of Present Illness  The patient presents for evaluation of sinus congestion, cough, and ear infection.    Sinus Congestion  - Currently, he reports severe sinus congestion.  - He has taken Sudafed today.    Cough  - Currently, he reports a worsening cough.    Ear Infection  - He was previously seen at an urgent care facility on 2025, where he was diagnosed with an ear infection and conjunctivitis.  - He was prescribed Augmentin and antibiotic eye drops, which he completed, resulting in significant improvement.  - He has observed a pattern of concurrent ear infections with chest or sinus issues, although these do not cause discomfort.  - He reports no ear pain but mentions a popping sensation when rubbing the area in front of his ear.    Supplemental information: He also experiences excessive sweating, even when not ill, but notes an increase during illness. His temperature was recorded as 98 degrees. He has not taken Tylenol and does not report any chills. He has previously been treated with a steroid dose pack.     25    ---URI and ear infection  Augmentin oral   cipro eye drops for pink eye       Was doing better but is now sick again     Now has sinus congestion, cough.   Sweating           Prior to Visit Medications    Medication Sig Taking? Authorizing Provider   albuterol sulfate HFA (PROVENTIL;VENTOLIN;PROAIR) 108 (90 Base) MCG/ACT inhaler Inhale 2 puffs into the lungs every 6 hours as needed for Wheezing Yes ProviderElgin MD   Spacer/Aero-Holding Chambers (OPTICHAMBER ERIKA) MISC USE WITH INHALER AS INSTRUCTED. Yes ProviderElgin MD   methylPREDNISolone (MEDROL DOSEPACK) 4 MG tablet Take by mouth. Yes Shy Juan APRN - NP   guaiFENesin (MUCINEX) 600 MG extended release tablet Take 1 tablet by mouth 2 times daily for 14 days Yes Drake

## 2025-06-23 ENCOUNTER — OFFICE VISIT (OUTPATIENT)
Dept: FAMILY MEDICINE CLINIC | Age: 25
End: 2025-06-23
Payer: COMMERCIAL

## 2025-06-23 VITALS
OXYGEN SATURATION: 97 % | BODY MASS INDEX: 35.25 KG/M2 | WEIGHT: 252.6 LBS | SYSTOLIC BLOOD PRESSURE: 132 MMHG | HEART RATE: 70 BPM | DIASTOLIC BLOOD PRESSURE: 86 MMHG

## 2025-06-23 DIAGNOSIS — F41.9 ANXIETY AND DEPRESSION: Primary | ICD-10-CM

## 2025-06-23 DIAGNOSIS — H65.93 MEE (MIDDLE EAR EFFUSION), BILATERAL: ICD-10-CM

## 2025-06-23 DIAGNOSIS — F32.A ANXIETY AND DEPRESSION: Primary | ICD-10-CM

## 2025-06-23 PROCEDURE — 99214 OFFICE O/P EST MOD 30 MIN: CPT | Performed by: NURSE PRACTITIONER

## 2025-06-23 RX ORDER — CETIRIZINE HYDROCHLORIDE 10 MG/1
10 TABLET ORAL DAILY
Qty: 90 TABLET | Refills: 0 | Status: SHIPPED | OUTPATIENT
Start: 2025-06-23

## 2025-06-23 RX ORDER — FLUTICASONE PROPIONATE 50 MCG
1 SPRAY, SUSPENSION (ML) NASAL DAILY
Qty: 32 G | Refills: 1 | Status: SHIPPED | OUTPATIENT
Start: 2025-06-23

## 2025-06-23 NOTE — PROGRESS NOTES
Topics    Alcohol use: No            Vitals:    06/23/25 1600   BP: 132/86   BP Site: Right Upper Arm   Patient Position: Sitting   BP Cuff Size: Medium Adult   Pulse: 70   SpO2: 97%   Weight: 114.6 kg (252 lb 9.6 oz)         Estimated body mass index is 35.25 kg/m² as calculated from the following:    Height as of 10/3/24: 1.803 m (5' 10.98\").    Weight as of this encounter: 114.6 kg (252 lb 9.6 oz).      Physical Exam  Vitals reviewed.   Constitutional:       General: He is not in acute distress.     Appearance: Normal appearance. He is not ill-appearing, toxic-appearing or diaphoretic.   HENT:      Head: Normocephalic and atraumatic.      Ears:      Comments: Right TM dark erythematous with middle ear effusion  Left TM erythematous with middle ear effusion     Nose: Nose normal.   Eyes:      Extraocular Movements: Extraocular movements intact.      Pupils: Pupils are equal, round, and reactive to light.   Cardiovascular:      Rate and Rhythm: Normal rate and regular rhythm.      Heart sounds: Normal heart sounds.   Pulmonary:      Effort: Pulmonary effort is normal. No respiratory distress.      Breath sounds: Normal breath sounds. No wheezing or rhonchi.   Musculoskeletal:         General: Normal range of motion.      Cervical back: Normal range of motion and neck supple.   Skin:     General: Skin is warm and dry.   Neurological:      General: No focal deficit present.      Mental Status: He is alert and oriented to person, place, and time. Mental status is at baseline.      Motor: No weakness.      Gait: Gait normal.   Psychiatric:         Mood and Affect: Mood normal.         Behavior: Behavior normal.         Thought Content: Thought content normal.         Judgment: Judgment normal.       Physical Exam  Ears: Both ears are erythematous.         ASSESSMENT/PLAN:  1. Anxiety and depression  2. DARRELL (middle ear effusion), bilateral          Results  - Imaging:    - X-ray of the back: Fracture at L3    - CT scan

## 2025-06-26 ENCOUNTER — OFFICE VISIT (OUTPATIENT)
Dept: ORTHOPEDIC SURGERY | Age: 25
End: 2025-06-26
Payer: COMMERCIAL

## 2025-06-26 VITALS
OXYGEN SATURATION: 97 % | HEIGHT: 70 IN | WEIGHT: 252 LBS | RESPIRATION RATE: 14 BRPM | BODY MASS INDEX: 36.08 KG/M2 | HEART RATE: 82 BPM

## 2025-06-26 DIAGNOSIS — G89.29 CHRONIC PAIN OF RIGHT KNEE: Primary | ICD-10-CM

## 2025-06-26 DIAGNOSIS — M25.561 CHRONIC PAIN OF RIGHT KNEE: Primary | ICD-10-CM

## 2025-06-26 PROCEDURE — 99214 OFFICE O/P EST MOD 30 MIN: CPT | Performed by: PHYSICIAN ASSISTANT

## 2025-06-26 RX ORDER — IBUPROFEN 800 MG/1
800 TABLET, FILM COATED ORAL
Qty: 90 TABLET | Refills: 0 | Status: SHIPPED | OUTPATIENT
Start: 2025-06-26

## 2025-06-26 ASSESSMENT — ENCOUNTER SYMPTOMS
RESPIRATORY NEGATIVE: 1
EYES NEGATIVE: 1
GASTROINTESTINAL NEGATIVE: 1

## 2025-06-26 NOTE — PROGRESS NOTES
Marital status: Single   Tobacco Use    Smoking status: Every Day     Current packs/day: 0.50     Average packs/day: 0.5 packs/day for 6.5 years (3.2 ttl pk-yrs)     Types: Cigarettes, E-Cigarettes     Start date: 1/1/2019    Smokeless tobacco: Never   Vaping Use    Vaping status: Every Day    Substances: Nicotine, Flavoring    Devices: Pre-filled or refillable cartridge, Pre-filled pod   Substance and Sexual Activity    Alcohol use: No    Drug use: Not Currently     Types: Marijuana (Weed)     Comment: Last used 8/2020    Sexual activity: Yes     Partners: Female   Social History Narrative    ** Merged History Encounter **          Social Drivers of Health     Financial Resource Strain: Medium Risk (3/19/2024)    Overall Financial Resource Strain (CARDIA)     Difficulty of Paying Living Expenses: Somewhat hard   Food Insecurity: No Food Insecurity (6/4/2025)    Hunger Vital Sign     Worried About Running Out of Food in the Last Year: Never true     Ran Out of Food in the Last Year: Never true   Transportation Needs: No Transportation Needs (6/4/2025)    PRAPARE - Transportation     Lack of Transportation (Medical): No     Lack of Transportation (Non-Medical): No   Housing Stability: Low Risk  (6/4/2025)    Housing Stability Vital Sign     Unable to Pay for Housing in the Last Year: No     Number of Times Moved in the Last Year: 0     Homeless in the Last Year: No       Current Outpatient Medications   Medication Sig Dispense Refill    ibuprofen (ADVIL;MOTRIN) 800 MG tablet Take 1 tablet by mouth 3 times daily (with meals) 90 tablet 0    fluticasone (FLONASE) 50 MCG/ACT nasal spray 1 spray by Each Nostril route daily 32 g 1    cetirizine (ZYRTEC) 10 MG tablet Take 1 tablet by mouth daily 90 tablet 0    Spacer/Aero-Holding Chambers (OPTICHAMBER ERIKA) MISC USE WITH INHALER AS INSTRUCTED.      FLUoxetine (PROZAC) 20 MG capsule Take 3 capsules by mouth daily 90 capsule 3    folic acid (FOLVITE) 1 MG tablet TAKE 1

## 2025-06-28 ENCOUNTER — HOSPITAL ENCOUNTER (OUTPATIENT)
Dept: MRI IMAGING | Age: 25
Discharge: HOME | End: 2025-06-28
Payer: COMMERCIAL

## 2025-06-28 DIAGNOSIS — M25.561 CHRONIC PAIN OF RIGHT KNEE: ICD-10-CM

## 2025-06-28 DIAGNOSIS — G89.29 CHRONIC PAIN OF RIGHT KNEE: ICD-10-CM

## 2025-06-28 PROCEDURE — 73721 MRI JNT OF LWR EXTRE W/O DYE: CPT

## 2025-07-09 ENCOUNTER — OFFICE VISIT (OUTPATIENT)
Dept: ORTHOPEDIC SURGERY | Age: 25
End: 2025-07-09
Payer: COMMERCIAL

## 2025-07-09 VITALS
BODY MASS INDEX: 36.08 KG/M2 | OXYGEN SATURATION: 97 % | HEIGHT: 70 IN | HEART RATE: 70 BPM | RESPIRATION RATE: 14 BRPM | WEIGHT: 252 LBS

## 2025-07-09 DIAGNOSIS — M25.561 CHRONIC PAIN OF RIGHT KNEE: Primary | ICD-10-CM

## 2025-07-09 DIAGNOSIS — G89.29 CHRONIC PAIN OF RIGHT KNEE: Primary | ICD-10-CM

## 2025-07-09 PROCEDURE — 99211 OFF/OP EST MAY X REQ PHY/QHP: CPT | Performed by: PHYSICIAN ASSISTANT

## 2025-07-09 ASSESSMENT — ENCOUNTER SYMPTOMS
EYES NEGATIVE: 1
GASTROINTESTINAL NEGATIVE: 1
RESPIRATORY NEGATIVE: 1

## 2025-07-09 NOTE — PATIENT INSTRUCTIONS
Continue weight-bearing as tolerated.  Continue range of motion exercises as instructed.  Ice and elevate as needed.  Tylenol or Motrin for pain.  Follow up in as needed to discuss surgery.     We are committed to providing you the best care possible.  If you receive a survey after visiting one of our offices, please take time to share your experience concerning your physician office visit.  These surveys are confidential and no health information about you is shared.  We are eager to improve for you and we are counting on your feedback to help make that happen.

## 2025-07-09 NOTE — PROGRESS NOTES
Review of Systems   Constitutional: Negative.    HENT: Negative.     Eyes: Negative.    Respiratory: Negative.     Cardiovascular: Negative.    Gastrointestinal: Negative.    Genitourinary: Negative.    Musculoskeletal:  Positive for arthralgias and myalgias.   Skin: Negative.    Neurological: Negative.    Psychiatric/Behavioral: Negative.       Current updated HPI 7/9/2025: Suleman Seals is a 24-year-old male that returns the office today to go over MRI results of his right knee.  He states the knee actually feels better today.  He states that his biggest issue is when he goes to kneel on his right knee and then take all the weight off of the left side and put all of it on his right knee and then he feels like something goes out of place.  Overall though right now he feels better than what he did the last time I saw him.       Previous HPI:  Suleman Seals is a 24 y.o. male that presents to the office today with complaints of right knee pain that has been getting worse recently.  He states that he has had several instances where he twisted the foot and bends the knee and then straightens the knee and it feels like something is caught in the knee.  He states that when it is caught he has difficulty straightening out completely    Past Medical History:   Diagnosis Date    Asthma     Follows with Dr. Khoury    Chondromalacia of right patellofemoral joint 01/12/2023    Chronic back pain     Depression     Displaced fracture of fifth metatarsal bone of left foot with routine healing 04/13/2023    ED (erectile dysfunction)     Erectile dysfunction     GERD (gastroesophageal reflux disease)     H/O deep venous thrombosis 03/21/2020    Headache     History of fracture of left ankle 03/21/2020    History of pulmonary embolism 03/21/2020    Hx of blood clots 10/22/2019    PE (after ankle fx)    Obesity     Osteoarthritis     Sprain of right foot 05/25/2023       Past Surgical History:   Procedure Laterality

## 2025-07-16 ENCOUNTER — OFFICE VISIT (OUTPATIENT)
Dept: ORTHOPEDIC SURGERY | Age: 25
End: 2025-07-16
Payer: COMMERCIAL

## 2025-07-16 VITALS — BODY MASS INDEX: 35.9 KG/M2 | WEIGHT: 250.2 LBS | HEART RATE: 76 BPM | OXYGEN SATURATION: 98 %

## 2025-07-16 DIAGNOSIS — S83.271A COMPLEX TEAR OF LATERAL MENISCUS OF RIGHT KNEE AS CURRENT INJURY, INITIAL ENCOUNTER: Primary | ICD-10-CM

## 2025-07-16 PROCEDURE — 99214 OFFICE O/P EST MOD 30 MIN: CPT | Performed by: STUDENT IN AN ORGANIZED HEALTH CARE EDUCATION/TRAINING PROGRAM

## 2025-07-16 ASSESSMENT — ENCOUNTER SYMPTOMS
DIARRHEA: 0
NAUSEA: 0
VOMITING: 0
SORE THROAT: 0
SHORTNESS OF BREATH: 0
WHEEZING: 0
COLOR CHANGE: 0
COUGH: 0
BACK PAIN: 0

## 2025-07-16 NOTE — PATIENT INSTRUCTIONS
Surgery was discussed in office today. If you have any questions regarding your surgery, please call our office at [phone] and ask to speak with Carli.     If you already spoke with Carli, please carefully review any instructions she has provided you for your upcoming surgery. If you have not spoken with Carli, she will be calling you to discuss scheduling your surgery and to go over surgery instructions.

## 2025-07-16 NOTE — PROGRESS NOTES
Patient is a 24 y.o. year old male. Patient is in the office today with RT knee. Patient states that he injured when getting up from sitting with his legs michaela crossed.. Patient states that the injury happened 2022. He was seen in our office jose 12/21/22 he was given a knee brace and an MRI was preformed. He reports recent popping and was seen by TAMMY Walker on 6/26/25 and MRI was ordered. He states that his knee popped about 1 wk ago and he was unable to bear weight for about 20 hrs. Due to pain and it being \"locked\". Pain scale  1/10.  Occupation:      RT knee MRI completed 6/28/25  IMPRESSION:       1. Small joint effusion with prominent superior plica.  2. Progressive signal abnormality involving the lateral meniscus, not meeting   discrete MRI criteria for tear.  3. Nonspecific cyst formation along the anterior horn of the lateral meniscus is   stable and nonspecific without discrete communication into the substance of the   meniscus.

## 2025-07-16 NOTE — PROGRESS NOTES
7/16/2025   Chief Complaint   Patient presents with    Knee Pain     Right        History of Present Illness:                             Suleman Seals is a 24 y.o. male      Patient is a 24 y.o. year old male. Patient is in the office today with RT knee. Patient states that he injured when getting up from sitting with his legs michaela crossed.. Patient states that the injury happened 2022. He was seen in our office jose 12/21/22 he was given a knee brace and an MRI was preformed. He reports recent popping and was seen by TAMMY Walker on 6/26/25 and MRI was ordered. He states that his knee popped about 1 wk ago and he was unable to bear weight for about 20 hrs. Due to pain and it being \"locked\". Pain scale  1/10.  Occupation:        RT knee MRI completed 6/28/25  IMPRESSION:       1. Small joint effusion with prominent superior plica.  2. Progressive signal abnormality involving the lateral meniscus, not meeting   discrete MRI criteria for tear.  3. Nonspecific cyst formation along the anterior horn of the lateral meniscus is   stable and nonspecific without discrete communication into the substance of the   meniscus.        Patient returns for right knee which have seen him for in the past but not recently.  He has been having mechanical symptoms and locking of the knee all the last few days have been better.  Denies any true significant injury.  MRI completed and reviewed by myself.    Medical History  Patient's medications, allergies, past medical, surgical, social and family histories were reviewed and updated as appropriate.    Past Medical History:   Diagnosis Date    Asthma     Follows with Dr. Khoury    Chondromalacia of right patellofemoral joint 01/12/2023    Chronic back pain     Depression     Displaced fracture of fifth metatarsal bone of left foot with routine healing 04/13/2023    ED (erectile dysfunction)     Erectile dysfunction     GERD (gastroesophageal reflux disease)     H/O deep

## 2025-07-21 ENCOUNTER — TELEPHONE (OUTPATIENT)
Dept: ORTHOPEDIC SURGERY | Age: 25
End: 2025-07-21

## 2025-07-21 NOTE — TELEPHONE ENCOUNTER
Patient scheduled for    Right Knee Lateral Meniscus Repair  Date: 9/2/25  Facility: [facility]  Surgeon: Suleman Anna DO  Product: [employer]    Prep for Proc 7/21/25  PCP clearance routed via Epic 7/17/25  Pre op appt 7/16/25    [employer]  Contacted 7/21/25 via called  Status: NPR CPT 30650 ICD S83.271A for outpatient  Ref# [id number]    Phone PAT

## 2025-07-31 ENCOUNTER — HOSPITAL ENCOUNTER (OUTPATIENT)
Dept: LAB | Age: 25
Discharge: HOME | End: 2025-07-31
Payer: COMMERCIAL

## 2025-07-31 DIAGNOSIS — E55.9 VITAMIN D DEFICIENCY: ICD-10-CM

## 2025-07-31 DIAGNOSIS — Z01.818 PREOP EXAMINATION: ICD-10-CM

## 2025-07-31 DIAGNOSIS — Z13.1 SCREENING FOR DIABETES MELLITUS: ICD-10-CM

## 2025-07-31 LAB
25(OH)D3 SERPL-MCNC: 36.4 NG/ML (ref 30–150)
ALBUMIN SERPL-MCNC: 4.3 G/DL (ref 3.4–5)
ALBUMIN/GLOB SERPL: 2.3 {RATIO} (ref 1.1–2.2)
ALP SERPL-CCNC: 56 U/L (ref 40–129)
ALT SERPL-CCNC: 19 U/L (ref 10–40)
ANION GAP SERPL CALCULATED.3IONS-SCNC: 9 MMOL/L (ref 9–17)
AST SERPL-CCNC: 21 U/L (ref 15–37)
BASOPHILS # BLD: 0.04 K/UL
BASOPHILS NFR BLD: 1 % (ref 0–1)
BILIRUB SERPL-MCNC: 0.3 MG/DL (ref 0–1)
BUN SERPL-MCNC: 9 MG/DL (ref 7–20)
CALCIUM SERPL-MCNC: 9.1 MG/DL (ref 8.3–10.6)
CHLORIDE SERPL-SCNC: 104 MMOL/L (ref 99–110)
CO2 SERPL-SCNC: 27 MMOL/L (ref 21–32)
CREAT SERPL-MCNC: 1 MG/DL (ref 0.9–1.3)
EOSINOPHIL # BLD: 0.43 K/UL
EOSINOPHILS RELATIVE PERCENT: 5 % (ref 0–3)
ERYTHROCYTE [DISTWIDTH] IN BLOOD BY AUTOMATED COUNT: 11.6 % (ref 11.7–14.9)
GFR, ESTIMATED: >90 ML/MIN/1.73M2
GLUCOSE SERPL-MCNC: 81 MG/DL (ref 74–99)
HCT VFR BLD AUTO: 42.3 % (ref 42–52)
HGB BLD-MCNC: 14.5 G/DL (ref 13.5–18)
IMM GRANULOCYTES # BLD AUTO: 0.02 K/UL
IMM GRANULOCYTES NFR BLD: 0 %
LYMPHOCYTES NFR BLD: 2.97 K/UL
LYMPHOCYTES RELATIVE PERCENT: 37 % (ref 24–44)
MCH RBC QN AUTO: 32.2 PG (ref 27–31)
MCHC RBC AUTO-ENTMCNC: 34.3 G/DL (ref 32–36)
MCV RBC AUTO: 94 FL (ref 78–100)
MONOCYTES NFR BLD: 0.63 K/UL
MONOCYTES NFR BLD: 8 % (ref 0–5)
NEUTROPHILS NFR BLD: 49 % (ref 36–66)
NEUTS SEG NFR BLD: 3.91 K/UL
PLATELET # BLD AUTO: 236 K/UL (ref 140–440)
PMV BLD AUTO: 10.2 FL (ref 7.5–11.1)
POTASSIUM SERPL-SCNC: 3.9 MMOL/L (ref 3.5–5.1)
PROT SERPL-MCNC: 6.2 G/DL (ref 6.4–8.2)
RBC # BLD AUTO: 4.5 M/UL (ref 4.6–6.2)
SODIUM SERPL-SCNC: 140 MMOL/L (ref 136–145)
WBC OTHER # BLD: 8 K/UL (ref 4–10.5)

## 2025-07-31 PROCEDURE — 80053 COMPREHEN METABOLIC PANEL: CPT

## 2025-07-31 PROCEDURE — 85025 COMPLETE CBC W/AUTO DIFF WBC: CPT

## 2025-07-31 PROCEDURE — 82306 VITAMIN D 25 HYDROXY: CPT

## 2025-08-11 ENCOUNTER — ANESTHESIA EVENT (OUTPATIENT)
Dept: OPERATING ROOM | Age: 25
End: 2025-08-11
Payer: COMMERCIAL

## 2025-08-11 ASSESSMENT — LIFESTYLE VARIABLES: SMOKING_STATUS: 1

## 2025-08-12 ENCOUNTER — HOSPITAL ENCOUNTER (OUTPATIENT)
Age: 25
Setting detail: OUTPATIENT SURGERY
Discharge: HOME | End: 2025-08-12
Attending: STUDENT IN AN ORGANIZED HEALTH CARE EDUCATION/TRAINING PROGRAM | Admitting: STUDENT IN AN ORGANIZED HEALTH CARE EDUCATION/TRAINING PROGRAM
Payer: COMMERCIAL

## 2025-08-12 ENCOUNTER — ANESTHESIA (OUTPATIENT)
Dept: OPERATING ROOM | Age: 25
End: 2025-08-12
Payer: COMMERCIAL

## 2025-08-12 VITALS
SYSTOLIC BLOOD PRESSURE: 120 MMHG | WEIGHT: 250 LBS | RESPIRATION RATE: 16 BRPM | HEART RATE: 87 BPM | DIASTOLIC BLOOD PRESSURE: 82 MMHG | TEMPERATURE: 98 F | HEIGHT: 70 IN | OXYGEN SATURATION: 99 % | BODY MASS INDEX: 35.79 KG/M2

## 2025-08-12 DIAGNOSIS — S83.271A COMPLEX TEAR OF LATERAL MENISCUS OF RIGHT KNEE AS CURRENT INJURY, INITIAL ENCOUNTER: Primary | ICD-10-CM

## 2025-08-12 PROCEDURE — 6370000000 HC RX 637 (ALT 250 FOR IP): Performed by: ANESTHESIOLOGY

## 2025-08-12 PROCEDURE — 2580000003 HC RX 258: Performed by: STUDENT IN AN ORGANIZED HEALTH CARE EDUCATION/TRAINING PROGRAM

## 2025-08-12 PROCEDURE — 2500000003 HC RX 250 WO HCPCS: Performed by: STUDENT IN AN ORGANIZED HEALTH CARE EDUCATION/TRAINING PROGRAM

## 2025-08-12 PROCEDURE — 3600000006 HC SURGERY LEVEL 6 BASE: Performed by: STUDENT IN AN ORGANIZED HEALTH CARE EDUCATION/TRAINING PROGRAM

## 2025-08-12 PROCEDURE — 2709999900 HC NON-CHARGEABLE SUPPLY: Performed by: STUDENT IN AN ORGANIZED HEALTH CARE EDUCATION/TRAINING PROGRAM

## 2025-08-12 PROCEDURE — 7100000011 HC PHASE II RECOVERY - ADDTL 15 MIN: Performed by: STUDENT IN AN ORGANIZED HEALTH CARE EDUCATION/TRAINING PROGRAM

## 2025-08-12 PROCEDURE — 6360000002 HC RX W HCPCS: Performed by: STUDENT IN AN ORGANIZED HEALTH CARE EDUCATION/TRAINING PROGRAM

## 2025-08-12 PROCEDURE — 2500000003 HC RX 250 WO HCPCS: Performed by: NURSE ANESTHETIST, CERTIFIED REGISTERED

## 2025-08-12 PROCEDURE — 6360000002 HC RX W HCPCS: Performed by: ANESTHESIOLOGY

## 2025-08-12 PROCEDURE — 3600000016 HC SURGERY LEVEL 6 ADDTL 15MIN: Performed by: STUDENT IN AN ORGANIZED HEALTH CARE EDUCATION/TRAINING PROGRAM

## 2025-08-12 PROCEDURE — 7100000010 HC PHASE II RECOVERY - FIRST 15 MIN: Performed by: STUDENT IN AN ORGANIZED HEALTH CARE EDUCATION/TRAINING PROGRAM

## 2025-08-12 PROCEDURE — 7100000001 HC PACU RECOVERY - ADDTL 15 MIN: Performed by: STUDENT IN AN ORGANIZED HEALTH CARE EDUCATION/TRAINING PROGRAM

## 2025-08-12 PROCEDURE — 3700000000 HC ANESTHESIA ATTENDED CARE: Performed by: STUDENT IN AN ORGANIZED HEALTH CARE EDUCATION/TRAINING PROGRAM

## 2025-08-12 PROCEDURE — 3700000001 HC ADD 15 MINUTES (ANESTHESIA): Performed by: STUDENT IN AN ORGANIZED HEALTH CARE EDUCATION/TRAINING PROGRAM

## 2025-08-12 PROCEDURE — 6360000002 HC RX W HCPCS: Performed by: NURSE ANESTHETIST, CERTIFIED REGISTERED

## 2025-08-12 PROCEDURE — L1833 KO ADJ JNT POS R SUP PRE OTS: HCPCS | Performed by: STUDENT IN AN ORGANIZED HEALTH CARE EDUCATION/TRAINING PROGRAM

## 2025-08-12 PROCEDURE — 2780000010 HC IMPLANT OTHER: Performed by: STUDENT IN AN ORGANIZED HEALTH CARE EDUCATION/TRAINING PROGRAM

## 2025-08-12 PROCEDURE — 94664 DEMO&/EVAL PT USE INHALER: CPT

## 2025-08-12 PROCEDURE — 94640 AIRWAY INHALATION TREATMENT: CPT

## 2025-08-12 PROCEDURE — 7100000000 HC PACU RECOVERY - FIRST 15 MIN: Performed by: STUDENT IN AN ORGANIZED HEALTH CARE EDUCATION/TRAINING PROGRAM

## 2025-08-12 PROCEDURE — 94761 N-INVAS EAR/PLS OXIMETRY MLT: CPT

## 2025-08-12 PROCEDURE — C1713 ANCHOR/SCREW BN/BN,TIS/BN: HCPCS | Performed by: STUDENT IN AN ORGANIZED HEALTH CARE EDUCATION/TRAINING PROGRAM

## 2025-08-12 PROCEDURE — 6370000000 HC RX 637 (ALT 250 FOR IP): Performed by: STUDENT IN AN ORGANIZED HEALTH CARE EDUCATION/TRAINING PROGRAM

## 2025-08-12 PROCEDURE — 2720000010 HC SURG SUPPLY STERILE: Performed by: STUDENT IN AN ORGANIZED HEALTH CARE EDUCATION/TRAINING PROGRAM

## 2025-08-12 DEVICE — IMPLANTABLE DEVICE: Type: IMPLANTABLE DEVICE | Site: KNEE | Status: FUNCTIONAL

## 2025-08-12 RX ORDER — FENTANYL CITRATE 50 UG/ML
25 INJECTION, SOLUTION INTRAMUSCULAR; INTRAVENOUS EVERY 5 MIN PRN
Status: DISCONTINUED | OUTPATIENT
Start: 2025-08-12 | End: 2025-08-12 | Stop reason: HOSPADM

## 2025-08-12 RX ORDER — LABETALOL HYDROCHLORIDE 5 MG/ML
10 INJECTION, SOLUTION INTRAVENOUS
Status: DISCONTINUED | OUTPATIENT
Start: 2025-08-12 | End: 2025-08-12 | Stop reason: HOSPADM

## 2025-08-12 RX ORDER — FENTANYL CITRATE 50 UG/ML
INJECTION, SOLUTION INTRAMUSCULAR; INTRAVENOUS
Status: DISCONTINUED | OUTPATIENT
Start: 2025-08-12 | End: 2025-08-12 | Stop reason: SDUPTHER

## 2025-08-12 RX ORDER — ONDANSETRON 2 MG/ML
INJECTION INTRAMUSCULAR; INTRAVENOUS
Status: DISCONTINUED | OUTPATIENT
Start: 2025-08-12 | End: 2025-08-12 | Stop reason: SDUPTHER

## 2025-08-12 RX ORDER — KETOROLAC TROMETHAMINE 30 MG/ML
30 INJECTION, SOLUTION INTRAMUSCULAR; INTRAVENOUS EVERY 6 HOURS
Status: CANCELLED | OUTPATIENT
Start: 2025-08-12 | End: 2025-08-15

## 2025-08-12 RX ORDER — ONDANSETRON 4 MG/1
4 TABLET, ORALLY DISINTEGRATING ORAL EVERY 8 HOURS PRN
Status: CANCELLED | OUTPATIENT
Start: 2025-08-12

## 2025-08-12 RX ORDER — OXYCODONE AND ACETAMINOPHEN 5; 325 MG/1; MG/1
1 TABLET ORAL EVERY 6 HOURS PRN
Qty: 28 TABLET | Refills: 0 | Status: SHIPPED | OUTPATIENT
Start: 2025-08-12 | End: 2025-08-19

## 2025-08-12 RX ORDER — SODIUM CHLORIDE, SODIUM LACTATE, POTASSIUM CHLORIDE, CALCIUM CHLORIDE 600; 310; 30; 20 MG/100ML; MG/100ML; MG/100ML; MG/100ML
INJECTION, SOLUTION INTRAVENOUS CONTINUOUS
Status: DISCONTINUED | OUTPATIENT
Start: 2025-08-12 | End: 2025-08-12 | Stop reason: HOSPADM

## 2025-08-12 RX ORDER — SODIUM CHLORIDE 0.9 % (FLUSH) 0.9 %
5-40 SYRINGE (ML) INJECTION EVERY 12 HOURS SCHEDULED
Status: DISCONTINUED | OUTPATIENT
Start: 2025-08-12 | End: 2025-08-12 | Stop reason: HOSPADM

## 2025-08-12 RX ORDER — ONDANSETRON 2 MG/ML
4 INJECTION INTRAMUSCULAR; INTRAVENOUS EVERY 6 HOURS PRN
Status: CANCELLED | OUTPATIENT
Start: 2025-08-12

## 2025-08-12 RX ORDER — ONDANSETRON 4 MG/1
4 TABLET, ORALLY DISINTEGRATING ORAL 3 TIMES DAILY PRN
Qty: 21 TABLET | Refills: 1 | Status: SHIPPED | OUTPATIENT
Start: 2025-08-12

## 2025-08-12 RX ORDER — SODIUM CHLORIDE 9 MG/ML
INJECTION, SOLUTION INTRAVENOUS PRN
Status: CANCELLED | OUTPATIENT
Start: 2025-08-12

## 2025-08-12 RX ORDER — OXYCODONE HYDROCHLORIDE 5 MG/1
5 TABLET ORAL EVERY 4 HOURS PRN
Refills: 0 | Status: CANCELLED | OUTPATIENT
Start: 2025-08-12

## 2025-08-12 RX ORDER — IPRATROPIUM BROMIDE AND ALBUTEROL SULFATE 2.5; .5 MG/3ML; MG/3ML
1 SOLUTION RESPIRATORY (INHALATION) ONCE
Status: COMPLETED | OUTPATIENT
Start: 2025-08-12 | End: 2025-08-12

## 2025-08-12 RX ORDER — DIPHENHYDRAMINE HYDROCHLORIDE 50 MG/ML
12.5 INJECTION, SOLUTION INTRAMUSCULAR; INTRAVENOUS
Status: DISCONTINUED | OUTPATIENT
Start: 2025-08-12 | End: 2025-08-12 | Stop reason: HOSPADM

## 2025-08-12 RX ORDER — PROCHLORPERAZINE EDISYLATE 5 MG/ML
5 INJECTION INTRAMUSCULAR; INTRAVENOUS
Status: DISCONTINUED | OUTPATIENT
Start: 2025-08-12 | End: 2025-08-12 | Stop reason: HOSPADM

## 2025-08-12 RX ORDER — HYDRALAZINE HYDROCHLORIDE 20 MG/ML
10 INJECTION INTRAMUSCULAR; INTRAVENOUS
Status: DISCONTINUED | OUTPATIENT
Start: 2025-08-12 | End: 2025-08-12 | Stop reason: HOSPADM

## 2025-08-12 RX ORDER — SODIUM CHLORIDE 9 MG/ML
INJECTION, SOLUTION INTRAVENOUS PRN
Status: DISCONTINUED | OUTPATIENT
Start: 2025-08-12 | End: 2025-08-12 | Stop reason: HOSPADM

## 2025-08-12 RX ORDER — CYCLOBENZAPRINE HCL 5 MG
5 TABLET ORAL 2 TIMES DAILY PRN
Qty: 30 TABLET | Refills: 2 | Status: SHIPPED | OUTPATIENT
Start: 2025-08-12 | End: 2025-08-22

## 2025-08-12 RX ORDER — SODIUM CHLORIDE 0.9 % (FLUSH) 0.9 %
5-40 SYRINGE (ML) INJECTION EVERY 12 HOURS SCHEDULED
Status: CANCELLED | OUTPATIENT
Start: 2025-08-12

## 2025-08-12 RX ORDER — SODIUM CHLORIDE, SODIUM LACTATE, POTASSIUM CHLORIDE, CALCIUM CHLORIDE 600; 310; 30; 20 MG/100ML; MG/100ML; MG/100ML; MG/100ML
INJECTION, SOLUTION INTRAVENOUS CONTINUOUS
Status: CANCELLED | OUTPATIENT
Start: 2025-08-12

## 2025-08-12 RX ORDER — ACETAMINOPHEN 500 MG
1000 TABLET ORAL ONCE
Status: COMPLETED | OUTPATIENT
Start: 2025-08-12 | End: 2025-08-12

## 2025-08-12 RX ORDER — SODIUM CHLORIDE 0.9 % (FLUSH) 0.9 %
5-40 SYRINGE (ML) INJECTION PRN
Status: DISCONTINUED | OUTPATIENT
Start: 2025-08-12 | End: 2025-08-12 | Stop reason: HOSPADM

## 2025-08-12 RX ORDER — PROPOFOL 10 MG/ML
INJECTION, EMULSION INTRAVENOUS
Status: DISCONTINUED | OUTPATIENT
Start: 2025-08-12 | End: 2025-08-12 | Stop reason: SDUPTHER

## 2025-08-12 RX ORDER — MIDAZOLAM HYDROCHLORIDE 1 MG/ML
INJECTION, SOLUTION INTRAMUSCULAR; INTRAVENOUS
Status: DISCONTINUED | OUTPATIENT
Start: 2025-08-12 | End: 2025-08-12 | Stop reason: SDUPTHER

## 2025-08-12 RX ORDER — KETAMINE HCL 50MG/ML(1)
SYRINGE (ML) INTRAVENOUS
Status: DISCONTINUED | OUTPATIENT
Start: 2025-08-12 | End: 2025-08-12 | Stop reason: SDUPTHER

## 2025-08-12 RX ORDER — OXYCODONE HYDROCHLORIDE 5 MG/1
10 TABLET ORAL EVERY 4 HOURS PRN
Refills: 0 | Status: CANCELLED | OUTPATIENT
Start: 2025-08-12

## 2025-08-12 RX ORDER — OXYCODONE HYDROCHLORIDE 5 MG/1
5 TABLET ORAL
Status: DISCONTINUED | OUTPATIENT
Start: 2025-08-12 | End: 2025-08-12 | Stop reason: HOSPADM

## 2025-08-12 RX ORDER — ROCURONIUM BROMIDE 10 MG/ML
INJECTION, SOLUTION INTRAVENOUS
Status: DISCONTINUED | OUTPATIENT
Start: 2025-08-12 | End: 2025-08-12 | Stop reason: SDUPTHER

## 2025-08-12 RX ORDER — ACETAMINOPHEN 325 MG/1
650 TABLET ORAL ONCE AS NEEDED
Status: DISCONTINUED | OUTPATIENT
Start: 2025-08-12 | End: 2025-08-12 | Stop reason: HOSPADM

## 2025-08-12 RX ORDER — SODIUM CHLORIDE 0.9 % (FLUSH) 0.9 %
5-40 SYRINGE (ML) INJECTION PRN
Status: CANCELLED | OUTPATIENT
Start: 2025-08-12

## 2025-08-12 RX ORDER — LIDOCAINE HYDROCHLORIDE 20 MG/ML
INJECTION, SOLUTION INTRAVENOUS
Status: DISCONTINUED | OUTPATIENT
Start: 2025-08-12 | End: 2025-08-12 | Stop reason: SDUPTHER

## 2025-08-12 RX ORDER — ONDANSETRON 2 MG/ML
4 INJECTION INTRAMUSCULAR; INTRAVENOUS
Status: COMPLETED | OUTPATIENT
Start: 2025-08-12 | End: 2025-08-12

## 2025-08-12 RX ORDER — MIDAZOLAM HYDROCHLORIDE 2 MG/2ML
2 INJECTION, SOLUTION INTRAMUSCULAR; INTRAVENOUS
Status: DISCONTINUED | OUTPATIENT
Start: 2025-08-12 | End: 2025-08-12 | Stop reason: HOSPADM

## 2025-08-12 RX ORDER — DEXAMETHASONE SODIUM PHOSPHATE 4 MG/ML
INJECTION, SOLUTION INTRA-ARTICULAR; INTRALESIONAL; INTRAMUSCULAR; INTRAVENOUS; SOFT TISSUE
Status: DISCONTINUED | OUTPATIENT
Start: 2025-08-12 | End: 2025-08-12 | Stop reason: SDUPTHER

## 2025-08-12 RX ORDER — ASPIRIN 325 MG
325 TABLET ORAL 2 TIMES DAILY
Qty: 60 TABLET | Refills: 1 | Status: SHIPPED | OUTPATIENT
Start: 2025-08-12

## 2025-08-12 RX ORDER — ASPIRIN 81 MG/1
81 TABLET, CHEWABLE ORAL ONCE
Status: COMPLETED | OUTPATIENT
Start: 2025-08-12 | End: 2025-08-12

## 2025-08-12 RX ADMIN — IPRATROPIUM BROMIDE AND ALBUTEROL SULFATE 1 DOSE: .5; 2.5 SOLUTION RESPIRATORY (INHALATION) at 07:10

## 2025-08-12 RX ADMIN — HYDROMORPHONE HYDROCHLORIDE 0.5 MG: 1 INJECTION, SOLUTION INTRAMUSCULAR; INTRAVENOUS; SUBCUTANEOUS at 09:13

## 2025-08-12 RX ADMIN — CEFAZOLIN 2000 MG: 2 INJECTION, POWDER, FOR SOLUTION INTRAMUSCULAR; INTRAVENOUS at 07:51

## 2025-08-12 RX ADMIN — ONDANSETRON 4 MG: 2 INJECTION INTRAMUSCULAR; INTRAVENOUS at 08:46

## 2025-08-12 RX ADMIN — HYDROMORPHONE HYDROCHLORIDE 0.5 MG: 1 INJECTION, SOLUTION INTRAMUSCULAR; INTRAVENOUS; SUBCUTANEOUS at 09:28

## 2025-08-12 RX ADMIN — SUGAMMADEX 200 MG: 100 INJECTION, SOLUTION INTRAVENOUS at 08:46

## 2025-08-12 RX ADMIN — HYDROMORPHONE HYDROCHLORIDE 1 MG: 1 INJECTION, SOLUTION INTRAMUSCULAR; INTRAVENOUS; SUBCUTANEOUS at 08:19

## 2025-08-12 RX ADMIN — ACETAMINOPHEN 1000 MG: 500 TABLET ORAL at 07:01

## 2025-08-12 RX ADMIN — Medication 25 MG: at 08:12

## 2025-08-12 RX ADMIN — MIDAZOLAM 2 MG: 1 INJECTION INTRAMUSCULAR; INTRAVENOUS at 07:30

## 2025-08-12 RX ADMIN — PROPOFOL 200 MG: 10 INJECTION, EMULSION INTRAVENOUS at 07:43

## 2025-08-12 RX ADMIN — Medication 25 MG: at 07:54

## 2025-08-12 RX ADMIN — FENTANYL CITRATE 100 MCG: 50 INJECTION, SOLUTION INTRAMUSCULAR; INTRAVENOUS at 07:39

## 2025-08-12 RX ADMIN — LIDOCAINE HYDROCHLORIDE 100 MG: 20 INJECTION, SOLUTION INTRAVENOUS at 07:43

## 2025-08-12 RX ADMIN — ASPIRIN 81 MG: 81 TABLET, CHEWABLE ORAL at 07:01

## 2025-08-12 RX ADMIN — SODIUM CHLORIDE, PRESERVATIVE FREE 10 ML: 5 INJECTION INTRAVENOUS at 07:02

## 2025-08-12 RX ADMIN — DEXAMETHASONE SODIUM PHOSPHATE 8 MG: 4 INJECTION, SOLUTION INTRAMUSCULAR; INTRAVENOUS at 07:49

## 2025-08-12 RX ADMIN — PROPOFOL 50 MG: 10 INJECTION, EMULSION INTRAVENOUS at 08:23

## 2025-08-12 RX ADMIN — ROCURONIUM BROMIDE 50 MG: 10 INJECTION, SOLUTION INTRAVENOUS at 07:43

## 2025-08-12 RX ADMIN — ONDANSETRON 4 MG: 2 INJECTION INTRAMUSCULAR; INTRAVENOUS at 10:35

## 2025-08-12 RX ADMIN — SODIUM CHLORIDE, SODIUM LACTATE, POTASSIUM CHLORIDE, AND CALCIUM CHLORIDE: .6; .31; .03; .02 INJECTION, SOLUTION INTRAVENOUS at 06:54

## 2025-08-12 ASSESSMENT — PAIN DESCRIPTION - DESCRIPTORS
DESCRIPTORS: SORE

## 2025-08-12 ASSESSMENT — PAIN DESCRIPTION - PAIN TYPE
TYPE: SURGICAL PAIN

## 2025-08-12 ASSESSMENT — PAIN DESCRIPTION - ONSET
ONSET: ON-GOING
ONSET: AWAKENED FROM SLEEP
ONSET: AWAKENED FROM SLEEP

## 2025-08-12 ASSESSMENT — PAIN DESCRIPTION - LOCATION
LOCATION: KNEE

## 2025-08-12 ASSESSMENT — ENCOUNTER SYMPTOMS
DIARRHEA: 0
NAUSEA: 0
COUGH: 0
WHEEZING: 0
COLOR CHANGE: 0
SORE THROAT: 0
VOMITING: 0
SHORTNESS OF BREATH: 0
BACK PAIN: 0

## 2025-08-12 ASSESSMENT — PAIN - FUNCTIONAL ASSESSMENT
PAIN_FUNCTIONAL_ASSESSMENT: PREVENTS OR INTERFERES SOME ACTIVE ACTIVITIES AND ADLS
PAIN_FUNCTIONAL_ASSESSMENT: 0-10
PAIN_FUNCTIONAL_ASSESSMENT: 0-10
PAIN_FUNCTIONAL_ASSESSMENT: ACTIVITIES ARE NOT PREVENTED
PAIN_FUNCTIONAL_ASSESSMENT: 0-10
PAIN_FUNCTIONAL_ASSESSMENT: PREVENTS OR INTERFERES SOME ACTIVE ACTIVITIES AND ADLS
PAIN_FUNCTIONAL_ASSESSMENT: 0-10
PAIN_FUNCTIONAL_ASSESSMENT: 0-10
PAIN_FUNCTIONAL_ASSESSMENT: PREVENTS OR INTERFERES SOME ACTIVE ACTIVITIES AND ADLS

## 2025-08-12 ASSESSMENT — PAIN DESCRIPTION - FREQUENCY
FREQUENCY: CONTINUOUS

## 2025-08-12 ASSESSMENT — PAIN DESCRIPTION - ORIENTATION
ORIENTATION: RIGHT;INNER

## 2025-08-12 ASSESSMENT — LIFESTYLE VARIABLES: SMOKING_STATUS: 1

## 2025-08-12 ASSESSMENT — PAIN SCALES - GENERAL
PAINLEVEL_OUTOF10: 7
PAINLEVEL_OUTOF10: 0
PAINLEVEL_OUTOF10: 3
PAINLEVEL_OUTOF10: 7

## 2025-08-13 ENCOUNTER — TELEPHONE (OUTPATIENT)
Dept: ORTHOPEDIC SURGERY | Age: 25
End: 2025-08-13

## 2025-08-13 DIAGNOSIS — Z98.890 STATUS POST LATERAL MENISCUS REPAIR: ICD-10-CM

## 2025-08-13 DIAGNOSIS — S83.271A COMPLEX TEAR OF LATERAL MENISCUS OF RIGHT KNEE AS CURRENT INJURY, INITIAL ENCOUNTER: Primary | ICD-10-CM

## 2025-08-18 DIAGNOSIS — Z98.890 STATUS POST LATERAL MENISCUS REPAIR: ICD-10-CM

## 2025-08-18 DIAGNOSIS — S83.271A COMPLEX TEAR OF LATERAL MENISCUS OF RIGHT KNEE AS CURRENT INJURY, INITIAL ENCOUNTER: Primary | ICD-10-CM

## 2025-08-21 ENCOUNTER — TELEPHONE (OUTPATIENT)
Dept: ORTHOPEDIC SURGERY | Age: 25
End: 2025-08-21

## 2025-08-21 DIAGNOSIS — S83.271A COMPLEX TEAR OF LATERAL MENISCUS OF RIGHT KNEE AS CURRENT INJURY, INITIAL ENCOUNTER: Primary | ICD-10-CM

## 2025-08-21 RX ORDER — OXYCODONE AND ACETAMINOPHEN 5; 325 MG/1; MG/1
1 TABLET ORAL EVERY 6 HOURS PRN
Qty: 28 TABLET | Refills: 0 | Status: SHIPPED | OUTPATIENT
Start: 2025-08-21 | End: 2025-08-28

## 2025-08-25 ENCOUNTER — TELEPHONE (OUTPATIENT)
Dept: ORTHOPEDIC SURGERY | Age: 25
End: 2025-08-25

## 2025-08-27 ENCOUNTER — OFFICE VISIT (OUTPATIENT)
Dept: ORTHOPEDIC SURGERY | Age: 25
End: 2025-08-27

## 2025-08-27 VITALS — TEMPERATURE: 97.6 F | RESPIRATION RATE: 14 BRPM | OXYGEN SATURATION: 97 % | HEART RATE: 74 BPM

## 2025-08-27 DIAGNOSIS — Z98.890 STATUS POST LATERAL MENISCUS REPAIR: Primary | ICD-10-CM

## 2025-08-27 PROCEDURE — 99024 POSTOP FOLLOW-UP VISIT: CPT | Performed by: PHYSICIAN ASSISTANT

## (undated) DEVICE — 34" SINGLE PATIENT USE HOVERMATT BREATHABLE: Brand: SINGLE PATIENT USE HOVERMATT

## (undated) DEVICE — SOLUTION IV IRRIG WATER 1000ML POUR BRL 2F7114

## (undated) DEVICE — TUBING PMP L16FT MAIN DISP FOR AR-6400 AR-6475 Â€“ ORDER MULTIPLES OF 10 EACH

## (undated) DEVICE — DRAPE TRNSPAR W51XL47IN PLAS MATTE FINISH U SHP IMPERV

## (undated) DEVICE — ENDOSCOPY KIT: Brand: MEDLINE INDUSTRIES, INC.

## (undated) DEVICE — SPONGE GZ W4XL8IN COT WVN 12 PLY

## (undated) DEVICE — GLOVE ORANGE PI 8   MSG9080

## (undated) DEVICE — DRAPE 3 QTR SHT POLYPR 53 IN X 77 IN ULTRAGARD

## (undated) DEVICE — ELECTRODE ES AD CRDLSS PT RET REM POLYHESIVE

## (undated) DEVICE — SYRINGE MED 30ML STD CLR PLAS LUERLOCK TIP N CTRL DISP

## (undated) DEVICE — CUFF TRNQT W4XL34IN 2 PRT SGL BLDR CYL DISP

## (undated) DEVICE — Z DISCONTINUED (USE MFG CAT MVABO)  TUBING GAS SAMPLING STD 6.5 FT FEMALE CONN SMRT CAPNOLINE

## (undated) DEVICE — TUBING INSUFFLATOR HEAT HI FLO SET PNEUMOCLEAR

## (undated) DEVICE — PADDING CAST W6INXL4YD COT LO LINTING WYTEX

## (undated) DEVICE — GOWN,ECLIPSE,POLYRNF,BRTHSLV,XL,30/CS: Brand: MEDLINE

## (undated) DEVICE — BANDAGE 6 IN ESMARCH COMPR W6INXL9FT E SMOOTH FINISH USED DURING LIMB

## (undated) DEVICE — BAG SPEC REM 224ML W4XL6IN DIA10MM 1 HND GYN DISP ENDOPCH

## (undated) DEVICE — TOWEL,OR,DSP,ST,BLUE,STD,6/PK,12PK/CS: Brand: MEDLINE

## (undated) DEVICE — FORCEPS BX L240CM JAW DIA2.8MM L CAP W/ NDL MIC MESH TOOTH

## (undated) DEVICE — TROCAR: Brand: KII® SLEEVE

## (undated) DEVICE — TROCAR: Brand: KII FIOS FIRST ENTRY

## (undated) DEVICE — SUTURE VCRL SZ 4-0 L27IN ABSRB UD L19MM FS-2 3/8 CIR REV J422H

## (undated) DEVICE — COUNTER NDL 30 COUNT FOAM STRP SGL MAG

## (undated) DEVICE — GLOVE SURG SZ 75 CRM LTX FREE POLYISOPRENE POLYMER BEAD ANTI

## (undated) DEVICE — WAND ABLAT 50DEG KNEE FOR CART REP SYS FLO 50 COBLATION

## (undated) DEVICE — GLOVE SURG SZ 8 L12IN THK75MIL DK GRN LTX FREE

## (undated) DEVICE — GLOVE SURG SZ 65 CRM LTX FREE POLYISOPRENE POLYMER BEAD ANTI

## (undated) DEVICE — GOWN,SIRUS,POLYRNF,BRTHSLV,XLN/XL,20/CS: Brand: MEDLINE

## (undated) DEVICE — MAT ABSRB W28XL48IN C6L FLR ULT W POLY BK

## (undated) DEVICE — ADHESIVE SKIN CLSR 0.7ML TOP DERMBND ADV

## (undated) DEVICE — SOLUTION IRRIG 3000ML 0.9% SOD CHL USP UROMATIC PLAS CONT

## (undated) DEVICE — BRACE ORTHOPEDIC POST OPERATIVE LG XL 28 IN BLK LTX

## (undated) DEVICE — DRAPE SHEET ULTRAGARD: Brand: MEDLINE

## (undated) DEVICE — APPLICATOR MEDICATED 26 CC SOLUTION HI LT ORNG CHLORAPREP

## (undated) DEVICE — TOWEL SURG W17XL27IN BLU COT STD PREWASHED STERILE 6 PER PK

## (undated) DEVICE — Device

## (undated) DEVICE — BLADE CLIPPER GEN PURP NS

## (undated) DEVICE — KNOT PUSHER/ PORTAL SKID

## (undated) DEVICE — PAD ABD W5XL9IN GEN USE NONADHESIVE EXTRA ABSRB SFT

## (undated) DEVICE — GOWN,ECLIPSE,POLYRNF,BRTHSLV,L,30/CS: Brand: MEDLINE

## (undated) DEVICE — PADDING CAST W6INXL4YD COT COHESIVE HND TEARABLE SPEC 100

## (undated) DEVICE — SYRINGE MED 20ML STD CLR PLAS LUERLOCK TIP N CTRL DISP

## (undated) DEVICE — GLOVE SURG SZ 7 CRM LTX FREE POLYISOPRENE POLYMER BEAD ANTI

## (undated) DEVICE — Z DUP USE 2641840 CLIP INT L POLYMER LOK LIG HEM O LOK

## (undated) DEVICE — BLADE SHAVER AGGRESSIVE + 4 MM RAZOR SHRP TOOTH RED SYNOVIUM

## (undated) DEVICE — LEGGING DRP SURG 49X33IN NON-FENESTRATED REINF W/O FLD PCH